# Patient Record
Sex: FEMALE | Race: BLACK OR AFRICAN AMERICAN | NOT HISPANIC OR LATINO | Employment: UNEMPLOYED | URBAN - METROPOLITAN AREA
[De-identification: names, ages, dates, MRNs, and addresses within clinical notes are randomized per-mention and may not be internally consistent; named-entity substitution may affect disease eponyms.]

---

## 2023-04-20 DIAGNOSIS — N63.22 MASS OF UPPER INNER QUADRANT OF LEFT BREAST: Primary | ICD-10-CM

## 2023-10-31 ENCOUNTER — HOSPITAL ENCOUNTER (EMERGENCY)
Facility: HOSPITAL | Age: 33
Discharge: HOME | End: 2023-10-31
Attending: EMERGENCY MEDICINE
Payer: MEDICAID

## 2023-10-31 VITALS
RESPIRATION RATE: 20 BRPM | SYSTOLIC BLOOD PRESSURE: 126 MMHG | TEMPERATURE: 98 F | DIASTOLIC BLOOD PRESSURE: 66 MMHG | HEART RATE: 61 BPM | OXYGEN SATURATION: 100 %

## 2023-10-31 DIAGNOSIS — L02.91 ABSCESS: Primary | ICD-10-CM

## 2023-10-31 PROCEDURE — 99284 EMERGENCY DEPT VISIT MOD MDM: CPT

## 2023-10-31 RX ORDER — CLINDAMYCIN HYDROCHLORIDE 300 MG/1
300 CAPSULE ORAL EVERY 6 HOURS
Qty: 28 CAPSULE | Refills: 0 | Status: SHIPPED | OUTPATIENT
Start: 2023-10-31 | End: 2023-11-07

## 2023-10-31 RX ORDER — HYDROCODONE BITARTRATE AND ACETAMINOPHEN 5; 325 MG/1; MG/1
1 TABLET ORAL EVERY 6 HOURS PRN
Qty: 12 TABLET | Refills: 0 | Status: SHIPPED | OUTPATIENT
Start: 2023-10-31

## 2023-10-31 NOTE — ED PROVIDER NOTES
Encounter Date: 10/31/2023       History     Chief Complaint   Patient presents with    Abscess     Reports recurring abscess/boil to L breast after biopsy. Has had it drained/seen wound care/ spot keeps coming back. Reports fevers at home, max 101F     HPI  32y/o female with PMH presents to ED for left breast lesion first noticed last night. Associated symptoms include Tmax 101 and pain. Denies nipple or lesion drainage. Had biopsy in 2020 without evidence of malignancy. Patient reports rejection of surgical clip at that time which body expelled and had had recurrent abscessed since. Had surgery at [facility] approx 2mo ago for tract closure, which improved symptoms. Last abx use, clindamycin, 1 month ago.    Review of patient's allergies indicates:  No Known Allergies  No past medical history on file.  No past surgical history on file.  No family history on file.     Review of Systems   Constitutional:  Negative for fever.   HENT:  Negative for sore throat.    Respiratory:  Negative for shortness of breath.    Cardiovascular:  Negative for chest pain.   Gastrointestinal:  Negative for nausea.   Genitourinary:  Negative for dysuria.   Musculoskeletal:  Negative for back pain.   Skin:  Negative for rash.   Neurological:  Negative for weakness.   Hematological:  Does not bruise/bleed easily.     Physical Exam     Initial Vitals [10/31/23 0924]   BP Pulse Resp Temp SpO2   129/69 76 17 98.8 °F (37.1 °C) 99 %      MAP       --         Physical Exam    HENT:   Head: Normocephalic and atraumatic.   Pulmonary/Chest:       Neurological: She is oriented to person, place, and time.   Skin: Skin is warm and dry.   1.5 round lesion with central induration, tenderness to palpation, and surrounding erythema without evidence of nipple involvement or drainage.          ED Course   Procedures  Labs Reviewed - No data to display       Imaging Results    None          Medications - No data to display  Medical Decision  Making  Recurrence of abscess formation rec drainage with out systemic symptoms or presentation. Patient declined I&D, however prior to discharge, lesion began to drain and dressed with apprioraite wound care. Hygiene and after care instructions discussed with patient.  reviewed, and pain medication sent to pharmacy as well as abx. Referral to [facility] surgery placed. Patient voiced understanding and agreement with plan.     Risk  Prescription drug management.                               Clinical Impression:   Final diagnoses:  [L02.91] Abscess (Primary)        ED Disposition Condition    Discharge Stable          ED Prescriptions       Medication Sig Dispense Start Date End Date Auth. Provider    HYDROcodone-acetaminophen (NORCO) 5-325 mg per tablet Take 1 tablet by mouth every 6 (six) hours as needed for Pain. 12 tablet 10/31/2023 -- Lina Gentile MD    clindamycin (CLEOCIN) 300 MG capsule Take 1 capsule (300 mg total) by mouth every 6 (six) hours. for 7 days 28 capsule 10/31/2023 11/7/2023 Lina Gentile MD          Follow-up Information       Follow up With Specialties Details Why Contact Info    [facility] General Surgery  Someone will contact you to schedule appointment. if you do not hear form them in 1 week, call office. [address] [id number] [phone]    [facility]  Emergency Dept Emergency Medicine  If symptoms worsen, As needed [address] [id number] [phone]             Lina Gentile MD  Resident  10/31/23 1202       Lina Gentile MD  Resident  10/31/23 1202

## 2024-10-02 ENCOUNTER — HOSPITAL ENCOUNTER (INPATIENT)
Facility: HOSPITAL | Age: 34
LOS: 6 days | Discharge: LEFT BEFORE DISCHARGE | DRG: 584 | End: 2024-10-08
Attending: INTERNAL MEDICINE | Admitting: INTERNAL MEDICINE
Payer: COMMERCIAL

## 2024-10-02 DIAGNOSIS — N64.4 BREAST PAIN: Primary | ICD-10-CM

## 2024-10-02 DIAGNOSIS — N61.1 ABSCESS OF RIGHT BREAST: ICD-10-CM

## 2024-10-02 DIAGNOSIS — R00.1 BRADYCARDIA: ICD-10-CM

## 2024-10-02 LAB
ALBUMIN SERPL-MCNC: 3.5 G/DL (ref 3.5–5)
ALBUMIN/GLOB SERPL: 0.9 RATIO (ref 1.1–2)
ALP SERPL-CCNC: 91 UNIT/L (ref 40–150)
ALT SERPL-CCNC: 16 UNIT/L (ref 0–55)
ANION GAP SERPL CALC-SCNC: 9 MEQ/L
AST SERPL-CCNC: 15 UNIT/L (ref 5–34)
B-HCG UR QL: NEGATIVE
BASOPHILS # BLD AUTO: 0.04 X10(3)/MCL
BASOPHILS NFR BLD AUTO: 0.4 %
BILIRUB SERPL-MCNC: 0.5 MG/DL
BUN SERPL-MCNC: 9.6 MG/DL (ref 7–18.7)
CALCIUM SERPL-MCNC: 9.2 MG/DL (ref 8.4–10.2)
CHLORIDE SERPL-SCNC: 103 MMOL/L (ref 98–107)
CO2 SERPL-SCNC: 27 MMOL/L (ref 22–29)
CREAT SERPL-MCNC: 0.82 MG/DL (ref 0.55–1.02)
CREAT/UREA NIT SERPL: 12
EOSINOPHIL # BLD AUTO: 0.36 X10(3)/MCL (ref 0–0.9)
EOSINOPHIL NFR BLD AUTO: 3.2 %
ERYTHROCYTE [DISTWIDTH] IN BLOOD BY AUTOMATED COUNT: 12.8 % (ref 11.5–17)
GFR SERPLBLD CREATININE-BSD FMLA CKD-EPI: >60 ML/MIN/1.73/M2
GLOBULIN SER-MCNC: 4 GM/DL (ref 2.4–3.5)
GLUCOSE SERPL-MCNC: 99 MG/DL (ref 74–100)
HCT VFR BLD AUTO: 38.8 % (ref 37–47)
HGB BLD-MCNC: 13.2 G/DL (ref 12–16)
IMM GRANULOCYTES # BLD AUTO: 0.01 X10(3)/MCL (ref 0–0.04)
IMM GRANULOCYTES NFR BLD AUTO: 0.1 %
LYMPHOCYTES # BLD AUTO: 2.39 X10(3)/MCL (ref 0.6–4.6)
LYMPHOCYTES NFR BLD AUTO: 21.1 %
MCH RBC QN AUTO: 33.3 PG (ref 27–31)
MCHC RBC AUTO-ENTMCNC: 34 G/DL (ref 33–36)
MCV RBC AUTO: 98 FL (ref 80–94)
MONOCYTES # BLD AUTO: 0.52 X10(3)/MCL (ref 0.1–1.3)
MONOCYTES NFR BLD AUTO: 4.6 %
NEUTROPHILS # BLD AUTO: 8.01 X10(3)/MCL (ref 2.1–9.2)
NEUTROPHILS NFR BLD AUTO: 70.6 %
NRBC BLD AUTO-RTO: 0 %
PLATELET # BLD AUTO: 287 X10(3)/MCL (ref 130–400)
PMV BLD AUTO: 10.3 FL (ref 7.4–10.4)
POTASSIUM SERPL-SCNC: 4.1 MMOL/L (ref 3.5–5.1)
PROT SERPL-MCNC: 7.5 GM/DL (ref 6.4–8.3)
RBC # BLD AUTO: 3.96 X10(6)/MCL (ref 4.2–5.4)
SODIUM SERPL-SCNC: 139 MMOL/L (ref 136–145)
WBC # BLD AUTO: 11.33 X10(3)/MCL (ref 4.5–11.5)

## 2024-10-02 PROCEDURE — 99285 EMERGENCY DEPT VISIT HI MDM: CPT | Mod: 25

## 2024-10-02 PROCEDURE — 80053 COMPREHEN METABOLIC PANEL: CPT | Performed by: NURSE PRACTITIONER

## 2024-10-02 PROCEDURE — 81025 URINE PREGNANCY TEST: CPT | Performed by: NURSE PRACTITIONER

## 2024-10-02 PROCEDURE — 25000003 PHARM REV CODE 250

## 2024-10-02 PROCEDURE — 85025 COMPLETE CBC W/AUTO DIFF WBC: CPT | Performed by: NURSE PRACTITIONER

## 2024-10-02 PROCEDURE — 11000001 HC ACUTE MED/SURG PRIVATE ROOM

## 2024-10-02 PROCEDURE — S4991 NICOTINE PATCH NONLEGEND: HCPCS

## 2024-10-02 RX ORDER — IBUPROFEN 200 MG
1 TABLET ORAL
Status: DISCONTINUED | OUTPATIENT
Start: 2024-10-02 | End: 2024-10-03

## 2024-10-02 RX ADMIN — NICOTINE 1 PATCH: 21 PATCH, EXTENDED RELEASE TRANSDERMAL at 09:10

## 2024-10-02 NOTE — FIRST PROVIDER EVALUATION
Medical screening examination initiated.  I have conducted a focused provider triage encounter, findings are as follows:    Brief history of present illness:  Patient states redness and swelling to right breast. States fever x 3 days.     There were no vitals filed for this visit.    Pertinent physical exam:  Awake, alert, ambulatory      Brief workup plan:  Labs, Exam    Preliminary workup initiated; this workup will be continued and followed by the physician or advanced practice provider that is assigned to the patient when roomed.

## 2024-10-02 NOTE — Clinical Note
Diagnosis: Breast pain [200743]   Future Attending Provider: EDWARD OLIVIER [[id number]]   Admit to which facility:: [facility] [[id number]]   Reason for IP Medical Treatment  (Clinical interventions that can only be accomplished in the IP setting? ) :: evaluation and treatment of breast mass   Plans for Post-Acute care--if anticipated (pick the single best option):: A. No post acute care anticipated at this time   Special Needs:: No Special Needs [1]

## 2024-10-03 PROBLEM — N64.4 BREAST PAIN: Status: ACTIVE | Noted: 2024-10-03

## 2024-10-03 LAB
ALBUMIN SERPL-MCNC: 3.6 G/DL (ref 3.5–5)
ALBUMIN/GLOB SERPL: 0.9 RATIO (ref 1.1–2)
ALP SERPL-CCNC: 106 UNIT/L (ref 40–150)
ALT SERPL-CCNC: 15 UNIT/L (ref 0–55)
ANION GAP SERPL CALC-SCNC: 8 MEQ/L
AST SERPL-CCNC: 19 UNIT/L (ref 5–34)
BASOPHILS # BLD AUTO: 0.04 X10(3)/MCL
BASOPHILS NFR BLD AUTO: 0.4 %
BILIRUB SERPL-MCNC: 0.4 MG/DL
BUN SERPL-MCNC: 10.8 MG/DL (ref 7–18.7)
CALCIUM SERPL-MCNC: 9.4 MG/DL (ref 8.4–10.2)
CHLORIDE SERPL-SCNC: 104 MMOL/L (ref 98–107)
CO2 SERPL-SCNC: 26 MMOL/L (ref 22–29)
CREAT SERPL-MCNC: 0.8 MG/DL (ref 0.55–1.02)
CREAT/UREA NIT SERPL: 14
EOSINOPHIL # BLD AUTO: 0.46 X10(3)/MCL (ref 0–0.9)
EOSINOPHIL NFR BLD AUTO: 4.7 %
ERYTHROCYTE [DISTWIDTH] IN BLOOD BY AUTOMATED COUNT: 13 % (ref 11.5–17)
GFR SERPLBLD CREATININE-BSD FMLA CKD-EPI: >60 ML/MIN/1.73/M2
GLOBULIN SER-MCNC: 4 GM/DL (ref 2.4–3.5)
GLUCOSE SERPL-MCNC: 88 MG/DL (ref 74–100)
HCT VFR BLD AUTO: 41.5 % (ref 37–47)
HGB BLD-MCNC: 13.7 G/DL (ref 12–16)
IMM GRANULOCYTES # BLD AUTO: 0.02 X10(3)/MCL (ref 0–0.04)
IMM GRANULOCYTES NFR BLD AUTO: 0.2 %
LYMPHOCYTES # BLD AUTO: 3.35 X10(3)/MCL (ref 0.6–4.6)
LYMPHOCYTES NFR BLD AUTO: 34 %
MCH RBC QN AUTO: 33.2 PG (ref 27–31)
MCHC RBC AUTO-ENTMCNC: 33 G/DL (ref 33–36)
MCV RBC AUTO: 100.5 FL (ref 80–94)
MONOCYTES # BLD AUTO: 0.59 X10(3)/MCL (ref 0.1–1.3)
MONOCYTES NFR BLD AUTO: 6 %
NEUTROPHILS # BLD AUTO: 5.38 X10(3)/MCL (ref 2.1–9.2)
NEUTROPHILS NFR BLD AUTO: 54.7 %
NRBC BLD AUTO-RTO: 0 %
PLATELET # BLD AUTO: 270 X10(3)/MCL (ref 130–400)
PMV BLD AUTO: 10.6 FL (ref 7.4–10.4)
POTASSIUM SERPL-SCNC: 3.8 MMOL/L (ref 3.5–5.1)
PROT SERPL-MCNC: 7.6 GM/DL (ref 6.4–8.3)
RBC # BLD AUTO: 4.13 X10(6)/MCL (ref 4.2–5.4)
SODIUM SERPL-SCNC: 138 MMOL/L (ref 136–145)
WBC # BLD AUTO: 9.84 X10(3)/MCL (ref 4.5–11.5)

## 2024-10-03 PROCEDURE — 85025 COMPLETE CBC W/AUTO DIFF WBC: CPT | Performed by: INTERNAL MEDICINE

## 2024-10-03 PROCEDURE — S4991 NICOTINE PATCH NONLEGEND: HCPCS | Performed by: INTERNAL MEDICINE

## 2024-10-03 PROCEDURE — 25000003 PHARM REV CODE 250: Performed by: INTERNAL MEDICINE

## 2024-10-03 PROCEDURE — 87040 BLOOD CULTURE FOR BACTERIA: CPT | Performed by: INTERNAL MEDICINE

## 2024-10-03 PROCEDURE — 36415 COLL VENOUS BLD VENIPUNCTURE: CPT | Performed by: INTERNAL MEDICINE

## 2024-10-03 PROCEDURE — 11000001 HC ACUTE MED/SURG PRIVATE ROOM

## 2024-10-03 PROCEDURE — 80053 COMPREHEN METABOLIC PANEL: CPT | Performed by: INTERNAL MEDICINE

## 2024-10-03 PROCEDURE — 63600175 PHARM REV CODE 636 W HCPCS: Performed by: INTERNAL MEDICINE

## 2024-10-03 RX ORDER — ONDANSETRON HYDROCHLORIDE 2 MG/ML
4 INJECTION, SOLUTION INTRAVENOUS EVERY 8 HOURS PRN
Status: DISCONTINUED | OUTPATIENT
Start: 2024-10-03 | End: 2024-10-06

## 2024-10-03 RX ORDER — KETOROLAC TROMETHAMINE 30 MG/ML
15 INJECTION, SOLUTION INTRAMUSCULAR; INTRAVENOUS EVERY 6 HOURS PRN
Status: DISCONTINUED | OUTPATIENT
Start: 2024-10-03 | End: 2024-10-05

## 2024-10-03 RX ORDER — HYDROCODONE BITARTRATE AND ACETAMINOPHEN 7.5; 325 MG/1; MG/1
1 TABLET ORAL EVERY 6 HOURS PRN
Status: DISCONTINUED | OUTPATIENT
Start: 2024-10-03 | End: 2024-10-04

## 2024-10-03 RX ORDER — TALC
6 POWDER (GRAM) TOPICAL NIGHTLY PRN
Status: DISCONTINUED | OUTPATIENT
Start: 2024-10-03 | End: 2024-10-08 | Stop reason: HOSPADM

## 2024-10-03 RX ORDER — ENOXAPARIN SODIUM 100 MG/ML
40 INJECTION SUBCUTANEOUS EVERY 24 HOURS
Status: DISCONTINUED | OUTPATIENT
Start: 2024-10-03 | End: 2024-10-08

## 2024-10-03 RX ORDER — SODIUM CHLORIDE 0.9 % (FLUSH) 0.9 %
10 SYRINGE (ML) INJECTION
Status: DISCONTINUED | OUTPATIENT
Start: 2024-10-03 | End: 2024-10-08 | Stop reason: HOSPADM

## 2024-10-03 RX ORDER — KETOROLAC TROMETHAMINE 30 MG/ML
15 INJECTION, SOLUTION INTRAMUSCULAR; INTRAVENOUS EVERY 6 HOURS PRN
Status: DISCONTINUED | OUTPATIENT
Start: 2024-10-03 | End: 2024-10-03

## 2024-10-03 RX ORDER — ACETAMINOPHEN 325 MG/1
650 TABLET ORAL EVERY 8 HOURS PRN
Status: DISCONTINUED | OUTPATIENT
Start: 2024-10-03 | End: 2024-10-04

## 2024-10-03 RX ORDER — IBUPROFEN 200 MG
1 TABLET ORAL DAILY
Status: DISCONTINUED | OUTPATIENT
Start: 2024-10-03 | End: 2024-10-08 | Stop reason: HOSPADM

## 2024-10-03 RX ADMIN — ACETAMINOPHEN 650 MG: 325 TABLET ORAL at 02:10

## 2024-10-03 RX ADMIN — CEFEPIME 1 G: 1 INJECTION, POWDER, FOR SOLUTION INTRAMUSCULAR; INTRAVENOUS at 09:10

## 2024-10-03 RX ADMIN — HYDROCODONE BITARTRATE AND ACETAMINOPHEN 1 TABLET: 7.5; 325 TABLET ORAL at 01:10

## 2024-10-03 RX ADMIN — VANCOMYCIN HYDROCHLORIDE 2500 MG: 1.25 INJECTION, POWDER, LYOPHILIZED, FOR SOLUTION INTRAVENOUS at 01:10

## 2024-10-03 RX ADMIN — KETOROLAC TROMETHAMINE 15 MG: 30 INJECTION, SOLUTION INTRAMUSCULAR at 09:10

## 2024-10-03 RX ADMIN — KETOROLAC TROMETHAMINE 15 MG: 30 INJECTION, SOLUTION INTRAMUSCULAR at 02:10

## 2024-10-03 RX ADMIN — KETOROLAC TROMETHAMINE 15 MG: 30 INJECTION, SOLUTION INTRAMUSCULAR at 12:10

## 2024-10-03 RX ADMIN — HYDROCODONE BITARTRATE AND ACETAMINOPHEN 1 TABLET: 7.5; 325 TABLET ORAL at 07:10

## 2024-10-03 RX ADMIN — KETOROLAC TROMETHAMINE 15 MG: 30 INJECTION, SOLUTION INTRAMUSCULAR at 07:10

## 2024-10-03 RX ADMIN — NICOTINE 1 PATCH: 21 PATCH, EXTENDED RELEASE TRANSDERMAL at 05:10

## 2024-10-03 RX ADMIN — ENOXAPARIN SODIUM 40 MG: 40 INJECTION SUBCUTANEOUS at 05:10

## 2024-10-03 RX ADMIN — VANCOMYCIN HYDROCHLORIDE 2000 MG: 500 INJECTION, POWDER, LYOPHILIZED, FOR SOLUTION INTRAVENOUS at 12:10

## 2024-10-03 NOTE — ED PROVIDER NOTES
"Encounter Date: 10/2/2024       History     Chief Complaint   Patient presents with    Breast Mass     Reports mass in left breast, sent by doctor for eval. Reports fever x 3 days. Also endorses nausea and diarrhea. Reports has been on abx past 2 weeks for recent removal of "mass" in left breast. Pt endorses redness and erythema to right breast.     59 y.o. White male presents to Emergency Department with a chief complaint of R breast pain. Symptoms began several days ago and have been worsening since onset. Patient reports she initially started with abscess to her L breast. She saw telemedicine and was prescribed Bactrim and Clindamycin 2 weeks ago. Despite abx therapy, patient's symptoms began on R breast and have been worsening. Associated symptoms include R breast pain, swelling, firmness, redness, and fever. Symptoms are aggravated with palpation and there are no alleviating factors. The patient denies CP, SOB, fever, chills, injury, or vomiting. No other reported symptoms at this time      The history is provided by the patient. No  was used.   Illness   The current episode started several days ago. The problem occurs continuously. The problem has been gradually worsening. Associated symptoms include a fever. Pertinent negatives include no photophobia, no abdominal pain, no nausea, no vomiting, no stridor, no cough and no shortness of breath. Services received include medications given. Recently, medical care has been given by the PCP.     Review of patient's allergies indicates:   Allergen Reactions    Iodine Rash     No past medical history on file.  No past surgical history on file.  No family history on file.     Review of Systems   Constitutional:  Positive for fever. Negative for chills and fatigue.   Eyes:  Negative for photophobia and visual disturbance.   Respiratory:  Negative for cough, shortness of breath and stridor.    Cardiovascular:  Negative for chest pain, palpitations and " leg swelling.   Gastrointestinal:  Negative for abdominal pain, nausea and vomiting.   Skin:  Positive for color change.   All other systems reviewed and are negative.      Physical Exam     Initial Vitals [10/02/24 1711]   BP Pulse Resp Temp SpO2   (!) 141/96 65 18 98.1 °F (36.7 °C) 100 %      MAP       --         Physical Exam    Nursing note and vitals reviewed.  Constitutional: She appears well-developed and well-nourished. She is not diaphoretic. She is cooperative.  Non-toxic appearance. No distress.   HENT:   Head: Normocephalic and atraumatic.   Right Ear: External ear normal.   Left Ear: External ear normal.   Nose: Nose normal.   Eyes: Conjunctivae and EOM are normal. Pupils are equal, round, and reactive to light.   Neck: Neck supple.   Normal range of motion.  Cardiovascular:  Normal rate, regular rhythm, S1 normal, S2 normal, normal heart sounds, intact distal pulses and normal pulses.           Pulmonary/Chest: Effort normal and breath sounds normal. No tachypnea and no bradypnea. No respiratory distress. She has no decreased breath sounds. She has no wheezes. She has no rhonchi. She has no rales. Right breast exhibits tenderness. Right breast exhibits no inverted nipple and no nipple discharge. Left breast exhibits no inverted nipple and no nipple discharge. There is breast swelling.   Tenderness, redness, and firmness noted to R breast. Healing wound noted to L breast. Patient reports previous abscess there. Exam performed with KRISTAL Espinosa as chaperone. No fluctuance noted.    Abdominal: Abdomen is soft. Bowel sounds are normal. She exhibits no distension. There is no abdominal tenderness. There is no rebound.   Genitourinary: There is breast swelling.   Musculoskeletal:         General: Normal range of motion.      Cervical back: Normal range of motion and neck supple.     Neurological: She is alert and oriented to person, place, and time. She has normal strength. No sensory deficit. GCS score is 15.  GCS eye subscore is 4. GCS verbal subscore is 5. GCS motor subscore is 6.   Skin: Skin is warm and dry. Capillary refill takes less than 2 seconds. There is erythema.   Psychiatric: She has a normal mood and affect. Thought content normal.         ED Course   Procedures  Labs Reviewed   COMPREHENSIVE METABOLIC PANEL - Abnormal       Result Value    Sodium 139      Potassium 4.1      Chloride 103      CO2 27      Glucose 99      Blood Urea Nitrogen 9.6      Creatinine 0.82      Calcium 9.2      Protein Total 7.5      Albumin 3.5      Globulin 4.0 (*)     Albumin/Globulin Ratio 0.9 (*)     Bilirubin Total 0.5      ALP 91      ALT 16      AST 15      eGFR >60      Anion Gap 9.0      BUN/Creatinine Ratio 12     CBC WITH DIFFERENTIAL - Abnormal    WBC 11.33      RBC 3.96 (*)     Hgb 13.2      Hct 38.8      MCV 98.0 (*)     MCH 33.3 (*)     MCHC 34.0      RDW 12.8      Platelet 287      MPV 10.3      Neut % 70.6      Lymph % 21.1      Mono % 4.6      Eos % 3.2      Basophil % 0.4      Lymph # 2.39      Neut # 8.01      Mono # 0.52      Eos # 0.36      Baso # 0.04      IG# 0.01      IG% 0.1      NRBC% 0.0     HCG QUALITATIVE URINE - Normal    hCG Qualitative, Urine Negative     CBC W/ AUTO DIFFERENTIAL    Narrative:     The following orders were created for panel order CBC Auto Differential.  Procedure                               Abnormality         Status                     ---------                               -----------         ------                     CBC with Differential[[id number]]       Abnormal            Final result                 Please view results for these tests on the individual orders.          Imaging Results    None          Medications   nicotine 21 mg/24 hr 1 patch (1 patch Transdermal Patch Applied 10/2/24 2130)     Medical Decision Making  Patient awake, alert, has non-labored breathing, and follows commands appropriately. Arrived to ED due to R breast problem. Symptoms began 4 days ago.  States she had issues with her L breast that started 2 weeks ago. Tele-medicine prescribed Bactrim and Clindamycin for treatment. Afebrile currently. States Tmax 102 at home. NAD Noted.     Judging by the patient's chief complaint and pertinent history, the patient has the following possible differential diagnoses, including but not limited to the following: Mastitis, Breast Mass, Breast Abscess, Cellulitis     Some of these are deemed to be lower likelihood and some more likely based on my physical exam and history combined with possible lab work and/or imaging studies. Please see the pertinent studies, and refer to the HPI. Some of these diagnoses will take further evaluation to fully rule out, perhaps as an outpatient and the patient was encouraged to follow up when discharged for more comprehensive evaluation.       Amount and/or Complexity of Data Reviewed  Labs: ordered.     Details: Labs essentially unremarkable on today. Informed patient of results.   Radiology: ordered.    Risk  OTC drugs.               ED Course as of 10/02/24 2224   Wed Oct 02, 2024   2124 Discussed patient with Dr. Ann. Patient failed outpatient therapy. Will obtain US of breast and admit to hospital for further evaluation and treatment.      paged.  [JA]   2223 Discussed patient with Dr. Pichardo, with . Will admit to services and obtain US of breast. No further instruction or recommendations given.  [JA]      ED Course User Index  [JA] Kathie Quinones NP                           Clinical Impression:  Final diagnoses:  [N64.4] Breast pain (Primary)          ED Disposition Condition    Admit                 Katihe Quinones NP  10/02/24 2224

## 2024-10-03 NOTE — PROGRESS NOTES
"Pharmacokinetic Initial Assessment: IV Vancomycin    Assessment/Plan:    Initiate intravenous vancomycin with loading dose of 2500 mg once followed by a maintenance dose of vancomycin 2000 mg IV every 12 hours  Desired empiric serum trough concentration is 15 to 20 mcg/mL  Draw vancomycin trough level 60 min prior to fourth dose on 10/04 at approximately 1230  Pharmacy will continue to follow and monitor vancomycin.      Please contact pharmacy at extension [phone] with any questions regarding this assessment.     Thank you for the consult,   Ashley Madrid       Patient brief summary:  Rene Alston is a 34 y.o. female initiated on antimicrobial therapy with IV Vancomycin for treatment of suspected skin & soft tissue infection    Drug Allergies:   Review of patient's allergies indicates:   Allergen Reactions    Iodine Rash       Actual Body Weight:   127 kg    Renal Function:   Estimated Creatinine Clearance: 142.4 mL/min (based on SCr of 0.82 mg/dL).,     Dialysis Method (if applicable):  N/A    CBC (last 72 hours):  Recent Labs   Lab Result Units 10/02/24  1743   WBC x10(3)/mcL 11.33   Hgb g/dL 13.2   Hct % 38.8   Platelet x10(3)/mcL 287   Mono % % 4.6   Eos % % 3.2   Basophil % % 0.4       Metabolic Panel (last 72 hours):  Recent Labs   Lab Result Units 10/02/24  1743   Sodium mmol/L 139   Potassium mmol/L 4.1   Chloride mmol/L 103   CO2 mmol/L 27   Glucose mg/dL 99   Blood Urea Nitrogen mg/dL 9.6   Creatinine mg/dL 0.82   Albumin g/dL 3.5   Bilirubin Total mg/dL 0.5   ALP unit/L 91   AST unit/L 15   ALT unit/L 16       Drug levels (last 3 results):  No results for input(s): "VANCOMYCINRA", "VANCORANDOM", "VANCOMYCINPE", "VANCOPEAK", "VANCOMYCINTR", "VANCOTROUGH" in the last 72 hours.    Microbiologic Results:  Microbiology Results (last 7 days)       ** No results found for the last 168 hours. **            "

## 2024-10-03 NOTE — PROGRESS NOTES
[facility] Hospital Medicine Progress Note        Chief Complaint: Inpatient Follow-up    HPI:     34-year-old  female with significant history of recurrent breast cellulitis/abscess in the past . patient has had mammogram which was reportedly benign and has had left breast biopsy which was reportedly benign.  She does follow up with Oncology as outpatient and reports that she is currently being worked up for ?  Leukemia.  Patient presented to the ED with complaints of right breast swelling, pain, erythema.  Was recently treated with clindamycin/Bactrim for left breast cellulitis.  Afebrile and stable hemodynamics in the ED. labs stable.  Admitted to hospital medicine services and was initiated on IV vancomycin, ultrasound soft tissue breast was ordered to rule out abscess.    Interval Hx:   Patient seen at bedside, persistent pain, swelling and erythema in right breast, patient is also bradycardic, but asymptomatic from the same, patient is afebrile, no other new complaints    Objective/physical exam:  General: In no acute distress, afebrile  Chest: Clear to auscultation bilaterally, right breast swollen, tender  Heart: S1, S2, no appreciable murmur  Abdomen: Soft, nontender, BS +  MSK: Warm, no lower extremity edema, no clubbing or cyanosis  Neurologic: Alert and oriented x4, moving all extremities with good strength     VITAL SIGNS: 24 HRS MIN & MAX LAST   Temp  Min: 97.9 °F (36.6 °C)  Max: 98.3 °F (36.8 °C) 97.9 °F (36.6 °C)   BP  Min: 101/58  Max: 141/96 (!) 101/58   Pulse  Min: 53  Max: 65  (!) 53   Resp  Min: 18  Max: 18 18   SpO2  Min: 100 %  Max: 100 % 100 %       Recent Labs   Lab 10/03/24  0713   WBC 9.84   RBC 4.13*   HGB 13.7   HCT 41.5   .5*   MCH 33.2*   MCHC 33.0   RDW 13.0      MPV 10.6*         Recent Labs   Lab 10/03/24  0713      K 3.8      CO2 26   BUN 10.8   CREATININE 0.80   CALCIUM 9.4   ALBUMIN 3.6   ALKPHOS 106   ALT  15   AST 19   BILITOT 0.4          Microbiology Results (last 7 days)       ** No results found for the last 168 hours. **             Scheduled Med:   enoxparin  40 mg Subcutaneous Daily    nicotine  1 patch Transdermal ED 1 Time    vancomycin (VANCOCIN) IV (PEDS and ADULTS)  2,000 mg Intravenous Q12H          Assessment/Plan:    Right breast cellulitis-rule out abscess  History of recurrent bilateral breast cellulitis with abscess requiring intervention  Asymptomatic sinus bradycardia   Chronic tobacco use  Prophylaxis    Patient is on IV vancomycin   Add cefepime for Gram-negative coverage   Patient reports she has had mammogram which was benign and also underwent left breast biopsy in the past which was benign pathology  I will consult breast surgeon for further recommendations   Soft tissue ultrasound was ordered on admit to rule out abscess, still pending  Blood cultures x2  Echocardiogram to further evaluate bradycardia   Patient is asymptomatic   Continue nicotine patch   Reports being worked up for leukemia, blood counts normal, continue outpatient follow up with Hematology/Oncology  P.r.n. narcotics for pain associated with cellulitis  DVT prophylaxis-subQ Lovenox      Leni Woods MD   10/03/2024

## 2024-10-03 NOTE — H&P
[facility] Hospital Medicine History & Physical Examination       Patient Name: Rene Alston  MRN: [id number]  Patient Class: IP- Inpatient   Admission Date: 10/2/2024  5:15 PM  Length of Stay: 1  Admitting Service: Hospital Medicine   Attending Physician: Ifeanyi Pichardo MD   Primary Care Provider: No, Primary Doctor  History source: EMR, patient and/or patient's family    CHIEF COMPLAINT   Breast pain     HISTORY OF PRESENT ILLNESS:   Patient is a 34-year-old female with no chronic medical conditions who presents to the ER with complaints of right breast pain over the last several days.  Patient reports over the last month she was diagnosed with left breast abscess which spontaneously drained and she was started on clindamycin and Bactrim 4 days ago.  The redness and pain in her left breast did increase but her right breast started to hurt and become erythematous even while on these current antibiotics for this reason she presented to the ER for further evaluation where laboratory work was unremarkable.  Hospitalist was consulted for admission for cellulitis.    PAST MEDICAL HISTORY:   Denies chronic medical conditions    PAST SURGICAL HISTORY:   No past surgical history on file.    ALLERGIES:   Iodine    FAMILY HISTORY:   Reviewed and non-contributory     SOCIAL HISTORY:   Screening for Social Drivers for health: Patient screened for food insecurity, housing instability, transportation needs, utility   difficulties, and interpersonal safety (select all that apply as identified as concern)  []Housing or Food  []Transportation Needs  []Utility Difficulties  []Interpersonal safety  [x]None  Social History     Tobacco Use    Smoking status: Not on file    Smokeless tobacco: Not on file   Substance Use Topics    Alcohol use: Not on file        HOME MEDICATIONS:     Prior to Admission medications    Medication Sig Start Date End Date Taking? Authorizing Provider  "  HYDROcodone-acetaminophen (NORCO) 5-325 mg per tablet Take 1 tablet by mouth every 6 (six) hours as needed for Pain. 10/31/23   Lina Gentile MD       REVIEW OF SYSTEMS:   Except as documented, all other systems reviewed and negative     PHYSICAL EXAM:   T 98.3 °F (36.8 °C)   /82   P (!) 57   RR 18   O2 100 %  GENERAL: awake, alert, oriented and in no acute distress, non-toxic appearing   HEENT: normocephalic atraumatic   NECK: supple   LUNGS: Clear bilaterally, no wheezing or rales, no accessory muscle use   CVS: Regular rate and rhythm, normal peripheral perfusion  ABD: Soft, non-tender, non-distended, bowel sounds present  EXTREMITIES: no clubbing or cyanosis  SKIN: Warm, dry.  Chaperone present in room, erythema and tenderness noted under right breast, no fluctuance  NEURO: alert and oriented, grossly without focal deficits   PSYCHIATRIC: Cooperative    LABS AND IMAGING:     Recent Labs     10/02/24  1743   WBC 11.33   RBC 3.96*   HGB 13.2   HCT 38.8   MCV 98.0*   MCH 33.3*   MCHC 34.0   RDW 12.8        No results for input(s): "LACTIC" in the last 72 hours.  No results for input(s): "INR", "APTT", "D-DIMER" in the last 72 hours.  No results for input(s): "HGBA1C", "CHOL", "TRIG", "LDL", "VLDL", "HDL" in the last 72 hours.   Recent Labs     10/02/24  1743      K 4.1   CO2 27   BUN 9.6   CREATININE 0.82   GLUCOSE 99   CALCIUM 9.2   ALBUMIN 3.5   GLOBULIN 4.0*   ALKPHOS 91   ALT 16   AST 15   BILITOT 0.5     No results for input(s): "BNP", "CPK", "TROPONINI" in the last 72 hours.           ASSESSMENT & PLAN:   Right breast cellulitis    -blood cultures and start IV vanc   -obtain US of the right breast to assess for abscess if present consult surgery    DVT prophylaxis:  Lovenox  Code status:  Full code    If patient was admitted under observational status it is with my approval/permission.     At least 55 min was spent on this history and physical.  Time seen: 11:00 p.m. 10/2  Ifeanyi GIRON" MD [place]

## 2024-10-04 PROBLEM — N61.1 CHRONIC ABSCESS OF BREAST: Status: ACTIVE | Noted: 2024-10-04

## 2024-10-04 PROBLEM — N61.1 ABSCESS OF RIGHT BREAST: Status: ACTIVE | Noted: 2024-10-04

## 2024-10-04 LAB
ALBUMIN SERPL-MCNC: 2.8 G/DL (ref 3.5–5)
ALBUMIN/GLOB SERPL: 0.9 RATIO (ref 1.1–2)
ALP SERPL-CCNC: 88 UNIT/L (ref 40–150)
ALT SERPL-CCNC: 17 UNIT/L (ref 0–55)
ANION GAP SERPL CALC-SCNC: 6 MEQ/L
APICAL FOUR CHAMBER EJECTION FRACTION: 50 %
APICAL TWO CHAMBER EJECTION FRACTION: 57 %
AST SERPL-CCNC: 18 UNIT/L (ref 5–34)
AV INDEX (PROSTH): 0.6
AV MEAN GRADIENT: 4 MMHG
AV PEAK GRADIENT: 6.8 MMHG
AV VELOCITY RATIO: 0.69
BASOPHILS # BLD AUTO: 0.02 X10(3)/MCL
BASOPHILS NFR BLD AUTO: 0.2 %
BILIRUB SERPL-MCNC: 0.2 MG/DL
BSA FOR ECHO PROCEDURE: 2.52 M2
BUN SERPL-MCNC: 10.3 MG/DL (ref 7–18.7)
CALCIUM SERPL-MCNC: 7.9 MG/DL (ref 8.4–10.2)
CHLORIDE SERPL-SCNC: 109 MMOL/L (ref 98–107)
CO2 SERPL-SCNC: 23 MMOL/L (ref 22–29)
CREAT SERPL-MCNC: 0.81 MG/DL (ref 0.55–1.02)
CREAT/UREA NIT SERPL: 13
CV ECHO LV RWT: 0.41 CM
DOP CALC AO PEAK VEL: 1.3 M/S
DOP CALC AO VTI: 31.4 CM
DOP CALC LVOT PEAK VEL: 0.9 M/S
DOP CALCLVOT PEAK VEL VTI: 18.7 CM
E WAVE DECELERATION TIME: 251 MSEC
E/A RATIO: 1.9
E/E' RATIO: 6.4 M/S
ECHO LV POSTERIOR WALL: 1.1 CM (ref 0.6–1.1)
EOSINOPHIL # BLD AUTO: 0.38 X10(3)/MCL (ref 0–0.9)
EOSINOPHIL NFR BLD AUTO: 4.3 %
ERYTHROCYTE [DISTWIDTH] IN BLOOD BY AUTOMATED COUNT: 12.8 % (ref 11.5–17)
FRACTIONAL SHORTENING: 25.9 % (ref 28–44)
GFR SERPLBLD CREATININE-BSD FMLA CKD-EPI: >60 ML/MIN/1.73/M2
GLOBULIN SER-MCNC: 3.1 GM/DL (ref 2.4–3.5)
GLUCOSE SERPL-MCNC: 84 MG/DL (ref 74–100)
HCT VFR BLD AUTO: 34.7 % (ref 37–47)
HGB BLD-MCNC: 11.7 G/DL (ref 12–16)
IMM GRANULOCYTES # BLD AUTO: 0.02 X10(3)/MCL (ref 0–0.04)
IMM GRANULOCYTES NFR BLD AUTO: 0.2 %
INTERVENTRICULAR SEPTUM: 1.1 CM (ref 0.6–1.1)
LEFT ATRIUM AREA SYSTOLIC (APICAL 2 CHAMBER): 16.1 CM2
LEFT ATRIUM AREA SYSTOLIC (APICAL 4 CHAMBER): 18.3 CM2
LEFT ATRIUM SIZE: 3.5 CM
LEFT ATRIUM VOLUME INDEX MOD: 21.1 ML/M2
LEFT ATRIUM VOLUME MOD: 51.3 ML
LEFT INTERNAL DIMENSION IN SYSTOLE: 4 CM (ref 2.1–4)
LEFT VENTRICLE DIASTOLIC VOLUME INDEX: 56.79 ML/M2
LEFT VENTRICLE DIASTOLIC VOLUME: 138 ML
LEFT VENTRICLE END DIASTOLIC VOLUME APICAL 2 CHAMBER: 119 ML
LEFT VENTRICLE END DIASTOLIC VOLUME APICAL 4 CHAMBER: 82.3 ML
LEFT VENTRICLE END SYSTOLIC VOLUME APICAL 2 CHAMBER: 45.1 ML
LEFT VENTRICLE END SYSTOLIC VOLUME APICAL 4 CHAMBER: 51.7 ML
LEFT VENTRICLE MASS INDEX: 96.6 G/M2
LEFT VENTRICLE SYSTOLIC VOLUME INDEX: 27.9 ML/M2
LEFT VENTRICLE SYSTOLIC VOLUME: 67.9 ML
LEFT VENTRICULAR INTERNAL DIMENSION IN DIASTOLE: 5.4 CM (ref 3.5–6)
LEFT VENTRICULAR MASS: 234.8 G
LV LATERAL E/E' RATIO: 5.71 M/S
LV SEPTAL E/E' RATIO: 7.27 M/S
LVED V (TEICH): 138 ML
LVES V (TEICH): 67.9 ML
LVOT MG: 2 MMHG
LVOT MV: 0.57 CM/S
LYMPHOCYTES # BLD AUTO: 2.33 X10(3)/MCL (ref 0.6–4.6)
LYMPHOCYTES NFR BLD AUTO: 26.3 %
MCH RBC QN AUTO: 33.1 PG (ref 27–31)
MCHC RBC AUTO-ENTMCNC: 33.7 G/DL (ref 33–36)
MCV RBC AUTO: 98.3 FL (ref 80–94)
MONOCYTES # BLD AUTO: 0.68 X10(3)/MCL (ref 0.1–1.3)
MONOCYTES NFR BLD AUTO: 7.7 %
MV PEAK A VEL: 0.42 M/S
MV PEAK E VEL: 0.8 M/S
NEUTROPHILS # BLD AUTO: 5.42 X10(3)/MCL (ref 2.1–9.2)
NEUTROPHILS NFR BLD AUTO: 61.3 %
NRBC BLD AUTO-RTO: 0 %
OHS LV EJECTION FRACTION SIMPSONS BIPLANE MOD: 57 %
PLATELET # BLD AUTO: 218 X10(3)/MCL (ref 130–400)
PMV BLD AUTO: 10.5 FL (ref 7.4–10.4)
POTASSIUM SERPL-SCNC: 3.8 MMOL/L (ref 3.5–5.1)
PROT SERPL-MCNC: 5.9 GM/DL (ref 6.4–8.3)
PV PEAK GRADIENT: 4 MMHG
PV PEAK VELOCITY: 0.95 M/S
RBC # BLD AUTO: 3.53 X10(6)/MCL (ref 4.2–5.4)
SODIUM SERPL-SCNC: 138 MMOL/L (ref 136–145)
TDI LATERAL: 0.14 M/S
TDI SEPTAL: 0.11 M/S
TDI: 0.13 M/S
VANCOMYCIN TROUGH SERPL-MCNC: 17.3 UG/ML (ref 15–20)
WBC # BLD AUTO: 8.85 X10(3)/MCL (ref 4.5–11.5)
Z-SCORE OF LEFT VENTRICULAR DIMENSION IN END DIASTOLE: -7.74
Z-SCORE OF LEFT VENTRICULAR DIMENSION IN END SYSTOLE: -4.42

## 2024-10-04 PROCEDURE — 63600175 PHARM REV CODE 636 W HCPCS: Performed by: INTERNAL MEDICINE

## 2024-10-04 PROCEDURE — 25000003 PHARM REV CODE 250: Performed by: PHYSICIAN ASSISTANT

## 2024-10-04 PROCEDURE — 85025 COMPLETE CBC W/AUTO DIFF WBC: CPT | Performed by: INTERNAL MEDICINE

## 2024-10-04 PROCEDURE — 11000001 HC ACUTE MED/SURG PRIVATE ROOM

## 2024-10-04 PROCEDURE — S4991 NICOTINE PATCH NONLEGEND: HCPCS | Performed by: INTERNAL MEDICINE

## 2024-10-04 PROCEDURE — 25000003 PHARM REV CODE 250: Performed by: INTERNAL MEDICINE

## 2024-10-04 PROCEDURE — 25000003 PHARM REV CODE 250: Performed by: NURSE PRACTITIONER

## 2024-10-04 PROCEDURE — 80053 COMPREHEN METABOLIC PANEL: CPT | Performed by: INTERNAL MEDICINE

## 2024-10-04 PROCEDURE — 80202 ASSAY OF VANCOMYCIN: CPT | Performed by: INTERNAL MEDICINE

## 2024-10-04 PROCEDURE — 99222 1ST HOSP IP/OBS MODERATE 55: CPT | Mod: ,,, | Performed by: PHYSICIAN ASSISTANT

## 2024-10-04 PROCEDURE — 36415 COLL VENOUS BLD VENIPUNCTURE: CPT | Performed by: INTERNAL MEDICINE

## 2024-10-04 RX ORDER — ACETAMINOPHEN 500 MG
1000 TABLET ORAL EVERY 8 HOURS PRN
Status: DISCONTINUED | OUTPATIENT
Start: 2024-10-04 | End: 2024-10-08 | Stop reason: HOSPADM

## 2024-10-04 RX ORDER — MAG HYDROX/ALUMINUM HYD/SIMETH 200-200-20
SUSPENSION, ORAL (FINAL DOSE FORM) ORAL 3 TIMES DAILY PRN
Status: DISCONTINUED | OUTPATIENT
Start: 2024-10-04 | End: 2024-10-08 | Stop reason: HOSPADM

## 2024-10-04 RX ORDER — HYDROCODONE BITARTRATE AND ACETAMINOPHEN 7.5; 325 MG/1; MG/1
1 TABLET ORAL EVERY 4 HOURS PRN
Status: DISCONTINUED | OUTPATIENT
Start: 2024-10-04 | End: 2024-10-05

## 2024-10-04 RX ORDER — HYDROCODONE BITARTRATE AND ACETAMINOPHEN 7.5; 325 MG/1; MG/1
1 TABLET ORAL EVERY 4 HOURS PRN
Status: CANCELLED | OUTPATIENT
Start: 2024-10-04

## 2024-10-04 RX ORDER — HYDROCODONE BITARTRATE AND ACETAMINOPHEN 7.5; 325 MG/1; MG/1
1 TABLET ORAL ONCE
Status: COMPLETED | OUTPATIENT
Start: 2024-10-04 | End: 2024-10-04

## 2024-10-04 RX ADMIN — NICOTINE 1 PATCH: 21 PATCH, EXTENDED RELEASE TRANSDERMAL at 09:10

## 2024-10-04 RX ADMIN — HYDROCODONE BITARTRATE AND ACETAMINOPHEN 1 TABLET: 7.5; 325 TABLET ORAL at 05:10

## 2024-10-04 RX ADMIN — CEFEPIME 1 G: 1 INJECTION, POWDER, FOR SOLUTION INTRAMUSCULAR; INTRAVENOUS at 09:10

## 2024-10-04 RX ADMIN — KETOROLAC TROMETHAMINE 15 MG: 30 INJECTION, SOLUTION INTRAMUSCULAR at 06:10

## 2024-10-04 RX ADMIN — ENOXAPARIN SODIUM 40 MG: 40 INJECTION SUBCUTANEOUS at 05:10

## 2024-10-04 RX ADMIN — HYDROCODONE BITARTRATE AND ACETAMINOPHEN 1 TABLET: 7.5; 325 TABLET ORAL at 07:10

## 2024-10-04 RX ADMIN — CEFEPIME 1 G: 1 INJECTION, POWDER, FOR SOLUTION INTRAMUSCULAR; INTRAVENOUS at 04:10

## 2024-10-04 RX ADMIN — HYDROCODONE BITARTRATE AND ACETAMINOPHEN 1 TABLET: 7.5; 325 TABLET ORAL at 12:10

## 2024-10-04 RX ADMIN — HYDROCODONE BITARTRATE AND ACETAMINOPHEN 1 TABLET: 7.5; 325 TABLET ORAL at 09:10

## 2024-10-04 RX ADMIN — KETOROLAC TROMETHAMINE 15 MG: 30 INJECTION, SOLUTION INTRAMUSCULAR at 10:10

## 2024-10-04 RX ADMIN — VANCOMYCIN HYDROCHLORIDE 2000 MG: 500 INJECTION, POWDER, LYOPHILIZED, FOR SOLUTION INTRAVENOUS at 01:10

## 2024-10-04 RX ADMIN — HYDROCODONE BITARTRATE AND ACETAMINOPHEN 1 TABLET: 7.5; 325 TABLET ORAL at 10:10

## 2024-10-04 RX ADMIN — HYDROCORTISONE: 10 OINTMENT TOPICAL at 10:10

## 2024-10-04 RX ADMIN — HYDROCODONE BITARTRATE AND ACETAMINOPHEN 1 TABLET: 7.5; 325 TABLET ORAL at 01:10

## 2024-10-04 RX ADMIN — CEFEPIME 1 G: 1 INJECTION, POWDER, FOR SOLUTION INTRAMUSCULAR; INTRAVENOUS at 12:10

## 2024-10-04 NOTE — PROGRESS NOTES
Pharmacokinetic Assessment Follow Up: IV Vancomycin    Vancomycin serum concentration assessment(s):    The trough level was drawn correctly and can be used to guide therapy at this time. The measurement is within the desired definitive target range of 10 to 20 mcg/mL.    Vancomycin Regimen Plan:    Continue current regimen of 2000 mg every 12 hours. The next serum trough concentration is scheduled for 10/6 at 1200 (prior to 1300 dose).    Scheduled Administration Times    0100  1300    Drug levels (last 3 results):  Recent Labs   Lab Result Units 10/04/24  1155   Vancomycin Trough ug/ml 17.3       Vancomycin Administrations:  vancomycin given in the last 96 hours                     vancomycin 2 g in dextrose 5 % 500 mL IVPB (mg) 2,000 mg New Bag 10/04/24 0100     2,000 mg New Bag 10/03/24 1246    vancomycin (VANCOCIN) 2,500 mg in D5W 500 mL IVPB (mg) 2,500 mg New Bag 10/03/24 0125                    Pharmacy will continue to follow and monitor vancomycin.    Please contact pharmacy at extension [phone] for questions regarding this assessment.    Thank you for the consult,   Aaron Ferguson       Patient brief summary:  Rene Alston is a 34 y.o. female initiated on antimicrobial therapy with IV Vancomycin for treatment of skin & soft tissue infection    The patient's current regimen is 2000 mg every 12 hours    Drug Allergies:   Review of patient's allergies indicates:   Allergen Reactions    Iodine Rash       Actual Body Weight:  Wt Readings from Last 1 Encounters:   10/03/24 127 kg (279 lb 15.8 oz)       Renal Function:   Estimated Creatinine Clearance: 144.1 mL/min (based on SCr of 0.81 mg/dL).    CBC (last 72 hours):  Recent Labs   Lab Result Units 10/02/24  1743 10/03/24  0713 10/04/24  0353   WBC x10(3)/mcL 11.33 9.84 8.85   Hgb g/dL 13.2 13.7 11.7*   Hct % 38.8 41.5 34.7*   Platelet x10(3)/mcL 287 270 218   Mono % % 4.6 6.0 7.7   Eos % % 3.2 4.7 4.3   Basophil % % 0.4 0.4 0.2       Metabolic Panel (last 72  hours):  Recent Labs   Lab Result Units 10/02/24  1743 10/03/24  0713 10/04/24  0353   Sodium mmol/L 139 138 138   Potassium mmol/L 4.1 3.8 3.8   Chloride mmol/L 103 104 109*   CO2 mmol/L 27 26 23   Glucose mg/dL 99 88 84   Blood Urea Nitrogen mg/dL 9.6 10.8 10.3   Creatinine mg/dL 0.82 0.80 0.81   Albumin g/dL 3.5 3.6 2.8*   Bilirubin Total mg/dL 0.5 0.4 0.2   ALP unit/L 91 106 88   AST unit/L 15 19 18   ALT unit/L 16 15 17       Microbiologic Results:  Microbiology Results (last 7 days)       Procedure Component Value Units Date/Time    Blood Culture [[id number]] Collected: 10/03/24 2137    Order Status: Resulted Specimen: Blood Updated: 10/03/24 2141    Blood Culture [[id number]] Collected: 10/03/24 2137    Order Status: Resulted Specimen: Blood Updated: 10/03/24 2141

## 2024-10-04 NOTE — CONSULTS
[facility] - 8th Floor Med Surg  Surgical Oncology  Consult Note    Patient Name: Rene Alston  MRN: [id number]  Code Status: Full Code  Admission Date: 10/2/2024  Hospital Length of Stay: 2 days  Attending Physician: Leni Woods MD  Primary Care Provider: Anu Primary Doctor    Inpatient consult to Breast Surgery  Consult performed by: Melanie Knight PA  Consult ordered by: Leni Woods MD        Subjective:     Chief Complaint/Reason for Admission: Right breast pain/abscess, chronic left breast abscess/fistula    History of Present Illness: Rene Alston is a 34 y.o. female who presented to [facility] ED 10/2 with right breast pain, swelling, and redness x's 3 weeks. 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  Her most recent procedure on the left breast was about 8 months ago at [facility] which seems to have been an I&D at the 10:00 periareolar position.  She states that her doctor has left the practice so she is unable to follow up. She has never had any problems on the right breast until 3 weeks ago when she developed pain and redness. She saw a provider through telehealth who prescribed Bactrim, but she did not have any improvement after 1 week and her antibiotic was changed to clindamycin.  Her right breast continued to worsen despite the oral antibiotics and she presented to the ED. Other than the fistula in the left breast, her left breast is currently asymptomatic.  Her vital signs were stable.  Blood cultures drawn.  She was started on IV vanc and admitted by hospitalist.  Breast surgery has been consulted for further management.  Patient states that since she was admitted for IV antibiotics, her right breast mass has significantly decreased in size.  She states it is currently the size of a lemon.    Patient is an everyday smoker, usually about 1 pack a cigarettes per  day and smokes medical marijuana twice per day.  She denies any chronic medical conditions, but she does report that she is undergoing workup/monitoring for possible smoldering or multiple myeloma with a doctor at [facility].  Her family history includes a mother with an unknown type of cancer who passed away after it  metastasized in her mid 40s.  Other than the breast procedures, her surgical history includes a D&C when she was 18 due to heavy menstrual bleeding. Reports that since she has had regular menstrual cycles with no heavy or prolonged bleeding.      She has had no recent mammograms, and thinks that the last one she had was in  at [facility] in [facility].  These reports/images are in her chart and were reviewed.  Bilateral diagnostic mammogram and left breast ultrasound on 2021 showed a focal fluid collection in the left breast which was favored to represent squamous metaplasia of lactiferous ducts versus abscess.  Needle biopsy was performed, which revealed acute and chronic inflammation around the squamous epithelium and keratin, consistent with the subareolar abscess.  Microbiology showed staph lugdunensis.  Patient states that after this needle biopsy was performed she required another I & D which is when her left breast fistula started.    OB/GYN History:  Age at Menarche Onset: 12  Menopausal Status: premenopausal, menstrual cycles regular  Hysterectomy/Oophorectomy: no, neither  Hormonal birth control (duration): yes, only when she was a teenager  Pregnancy History:   Age at first live birth: n/a  Hormone Replacement Therapy: No, none    Other:  MG breast density: unknown  Prior thoracic RT: none  Genetic testing: none  Ashkenazi Anabaptist descent: No    No current facility-administered medications on file prior to encounter.     Current Outpatient Medications on File Prior to Encounter   Medication Sig    HYDROcodone-acetaminophen (NORCO) 5-325 mg per tablet Take 1 tablet by mouth every 6  (six) hours as needed for Pain.       Review of patient's allergies indicates:   Allergen Reactions    Iodine Rash       No past medical history on file.  No past surgical history on file.  Family History    None       Tobacco Use    Smoking status: Not on file    Smokeless tobacco: Not on file   Substance and Sexual Activity    Alcohol use: Not on file    Drug use: Not on file    Sexual activity: Not on file     Review of Systems  Objective:     Vital Signs (Most Recent):  Temp: 97.9 °F (36.6 °C) (10/04/24 0729)  Pulse: (!) 45 (10/04/24 0729)  Resp: 16 (10/04/24 0729)  BP: 105/60 (10/04/24 0729)  SpO2: 100 % (10/04/24 0729) Vital Signs (24h Range):  Temp:  [97.7 °F (36.5 °C)-98 °F (36.7 °C)] 97.9 °F (36.6 °C)  Pulse:  [45-59] 45  Resp:  [16-18] 16  SpO2:  [98 %-100 %] 100 %  BP: (105-124)/(50-77) 105/60     Weight: 127 kg (279 lb 15.8 oz)  Body mass index is 39.05 kg/m².      Intake/Output Summary (Last 24 hours) at 10/4/2024 1011  Last data filed at 10/4/2024 0550  Gross per 24 hour   Intake 1500 ml   Output 400 ml   Net 1100 ml       Physical Exam  General: The patient is awake, alert and oriented times three. The patient is well nourished and in no acute distress.  Neck: There is no evidence of palpable cervical, supraclavicular or axillary adenopathy. The neck is supple. The thyroid is not enlarged.  Musculoskeletal: The patient has a normal range of motion of her bilateral upper extremities.  Chest: Examination of the chest wall fails to reveal any obvious abnormalities. Nonlabored breathing, symmetric expansion.  Breast:  Right: There is an erythematous hard tender subareolar mass consistent with breast abscess. No fluctuance. On US, it measures 28 mm but on exam, the mass is palpably larger (about 6 cm) due to surrounding inflammation. Otherwise, examination of right breast fails to reveal any dominant masses or areas of significant focal nodularity. The nipple is everted without evidence of discharge. There  is no skin dimpling with movement of the pectoralis. There are no significant skin changes overlying the breast.   Left: There is a well healed scar in the 3:00 axis from prior core needle biopsy to the left breast. In the 10:00 periareolar position, there is another scar with a draining fistula present. Examination of the left breast fails to reveal any dominant masses or areas of significant focal nodularity. The nipple is everted without evidence of discharge. There is no skin dimpling with movement of the pectoralis. There are no significant skin changes overlying the breast.  Abdomen: The abdomen is soft, flat, nontender and nondistended.  Integumentary: no rashes or skin lesions present  Neurologic: cranial nerves intact, no signs of peripheral neurological deficit, motor/sensory function intact    Significant Labs:  CBC:   Recent Labs   Lab 10/04/24  0353   WBC 8.85   RBC 3.53*   HGB 11.7*   HCT 34.7*      MCV 98.3*   MCH 33.1*   MCHC 33.7     CMP:   Recent Labs   Lab 10/04/24  0353   CALCIUM 7.9*   ALBUMIN 2.8*      K 3.8   CO2 23   *   BUN 10.3   CREATININE 0.81   ALKPHOS 88   ALT 17   AST 18   BILITOT 0.2     All pertinent labs from the last 24 hours have been reviewed.    Significant Diagnostics:  U/S: I have reviewed all pertinent results/findings within the past 24 hours.  Ultrasound of the chest directed to the area of pain and swelling on her right breast. Imaging of the inferior right breast identifies a hypoechoic complex fluid collection measuring 24 x 21 x 28 mm.     Assessment/Plan:     Patient Active Problem List    Diagnosis Date Noted    Abscess of right breast 10/04/2024    Chronic abscess of breast 10/04/2024    Breast pain 10/03/2024        Patient's presentation is most consistent with chronic left breast abscesses/fistula and now with periductal mastitis on the right breast which has now developed into a right breast abscess. In cases such as this with  recurrence/chronic periductal mastitis with abscesses or fistulas that do not heal despite multiple antibiotics and I&Ds, an operation to completely remove the affected area is required for definitive management.    Discussed options of management with patient:   1) Right Breast I&D at bedside vs in OR today/tomorrow. Patient refuses bedside I&D and has eaten today. Discussed if opts for right side I&D, would need to eventually take patient back for another surgery at a later date to have bilateral central duct excisions for definitive management.  2) Given good response to IV antibiotics, may continue antibiotics over the weekend to decrease size of abscess over the next few days. If she continues with this good response and abscess shrinks enough, would perform bilateral central duct excision possibly next week for definitive management. Dicussed should she be found to not continue response to IV antibiotics, would need to perform right breast I&D and then eventually take back for central duct excision at a later date.    ----------------------------------------------------------------------------------------------------------------------------     After options of management including risks/benefits were discussed, patient opted for option # 2 explained above (continued IV antibiotics over the weekend to decrease size of abscess over the next few days with eventual plans for surgery).     Breast Surgery team will continue to round on patient daily to monitor response to therapy. If she continues with this good response and abscess shrinks enough, would perform bilateral central duct excision possibly next week for definitive management. Dicussed should she be found to not continue response to IV antibiotics, would need to perform right breast I&D and then eventually take back for central duct excision at a later date.    Patient may continue eat today but will place on NPO again at midnight in case symptoms  worsen and surgery is needed tomorrow.    Advised smoking cessation explaining that smokers have an increased risk of periductal mastitis and cause recurrences due to chronic damage to breast ducts.    Continue vancomycin and CBC/CMP labs.    After discharge, will need follow up BL DG MG and US. This is an outpatient exam and will order this at time of discharge.         Thank you for your consult. We will follow-up with patient. Please contact us if you have any additional questions.    JENNIFER Oates  Breast Surgical Oncology  [facility] - 8th Floor Med Surg

## 2024-10-04 NOTE — PLAN OF CARE
10/03/24 1600   Discharge Assessment   Assessment Type Discharge Planning Assessment   Confirmed/corrected address, phone number and insurance Yes   Confirmed Demographics Correct on Facesheet   Source of Information patient   Communicated YADIEL with patient/caregiver Date not available/Unable to determine   Reason For Admission Breast Pain   People in Home alone   Do you expect to return to your current living situation? Yes   Do you have help at home or someone to help you manage your care at home? No   Prior to hospitilization cognitive status: Unable to Assess   Current cognitive status: Alert/Oriented   Walking or Climbing Stairs Difficulty no   Dressing/Bathing Difficulty no   Home Accessibility wheelchair accessible   Home Layout Able to live on 1st floor   Equipment Currently Used at Home none   Do you currently have service(s) that help you manage your care at home? No   Do you have prescription coverage? Yes   Coverage BCBS   Who is going to help you get home at discharge? [employer]   How do you get to doctors appointments? public transportation;health plan transportation   Are you on dialysis? No   Do you take coumadin? No   Discharge Plan A Home   Discharge Plan B Home Health   DME Needed Upon Discharge  none   Discharge Plan discussed with: Patient   Transition of Care Barriers None   Physical Activity   On average, how many days per week do you engage in moderate to strenuous exercise (like a brisk walk)? 0 days   On average, how many minutes do you engage in exercise at this level? 0 min   Social Isolation   How often do you feel lonely or isolated from those around you?  Never   Alcohol Use   Q1: How often do you have a drink containing alcohol? Never   Q2: How many drinks containing alcohol do you have on a typical day when you are drinking? None     Patient lives alone and is not current with any home health agency. She does not have a PCP at this time. Rece[facility] two  months. 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 She will require transportation assistance upon dc. [place] referral sent via [facility] yesterday.

## 2024-10-04 NOTE — PROGRESS NOTES
[facility] Hospital Medicine Progress Note        Chief Complaint: Inpatient Follow-up    HPI:     34-year-old  female with significant history of recurrent breast cellulitis/abscess in the past . patient has had mammogram which was reportedly benign and has had left breast biopsy which was reportedly benign.  She does follow up with Oncology as outpatient and reports that she is currently being worked up for ?  Leukemia.  Patient presented to the ED with complaints of right breast swelling, pain, erythema.  Was recently treated with clindamycin/Bactrim for left breast cellulitis.  Afebrile and stable hemodynamics in the ED. labs stable.  Admitted to hospital medicine services and was initiated on IV vancomycin, ultrasound soft tissue breast was ordered to rule out abscess.  Also decided to consult breast surgeon given her history of recurrent abscess.  Antibiotics broadened to cefepime, vancomycin, bradycardic, however asymptomatic, ordered TTE    Interval Hx:     Patient seen at bedside, comfortably sitting in the couch, still has right breast pain, erythema and swelling, hemodynamics stable except for sinus bradycardia, but asymptomatic from the same, patient is afebrile.    Objective/physical exam:  General: In no acute distress, afebrile  Chest: Clear to auscultation bilaterally, right breast swollen, tender  Heart: S1, S2, no appreciable murmur  Abdomen: Soft, nontender, BS +  MSK: Warm, no lower extremity edema, no clubbing or cyanosis  Neurologic: Alert and oriented x4, moving all extremities with good strength     VITAL SIGNS: 24 HRS MIN & MAX LAST   Temp  Min: 97.7 °F (36.5 °C)  Max: 98 °F (36.7 °C) 97.9 °F (36.6 °C)   BP  Min: 105/60  Max: 124/68 105/60   Pulse  Min: 45  Max: 59  (!) 45   Resp  Min: 16  Max: 18 16   SpO2  Min: 98 %  Max: 100 % 100 %       Recent Labs   Lab 10/04/24  0353   WBC 8.85   RBC 3.53*   HGB 11.7*   HCT 34.7*   MCV 98.3*   MCH 33.1*    MCHC 33.7   RDW 12.8      MPV 10.5*         Recent Labs   Lab 10/04/24  0353      K 3.8   *   CO2 23   BUN 10.3   CREATININE 0.81   CALCIUM 7.9*   ALBUMIN 2.8*   ALKPHOS 88   ALT 17   AST 18   BILITOT 0.2          Microbiology Results (last 7 days)       Procedure Component Value Units Date/Time    Blood Culture [[id number]] Collected: 10/03/24 2137    Order Status: Resulted Specimen: Blood Updated: 10/03/24 2141    Blood Culture [[id number]] Collected: 10/03/24 2137    Order Status: Resulted Specimen: Blood Updated: 10/03/24 2141             Scheduled Med:   ceFEPime IV (PEDS and ADULTS)  1 g Intravenous Q8H    enoxparin  40 mg Subcutaneous Daily    nicotine  1 patch Transdermal Daily    vancomycin (VANCOCIN) IV (PEDS and ADULTS)  2,000 mg Intravenous Q12H          Assessment/Plan:    Right breast cellulitis with underlying abscess  Suspected periductal mastitis  History of recurrent bilateral breast cellulitis with abscess requiring intervention  Asymptomatic sinus bradycardia   Chronic tobacco use  Prophylaxis      Ultrasound confirmed abscess  Patient is currently on IV cefepime, vancomycin   I have also consulted breast surgeon   Case discussed with breast surgeon PA  High suspicion for periductal mastitis   Incision and drainage alone will not be beneficial and the patient will benefit from central duct excision bilaterally  Plan currently is to continue IV antibiotics over the weekend and plan for the procedure possibly early next week  Follow up blood cultures  Echocardiogram ordered 10/3 to further evaluate bradycardia   Patient is asymptomatic   Continue nicotine patch   Reports being worked up for leukemia, blood counts normal, continue outpatient follow up with Hematology/Oncology  P.r.n. narcotics for pain associated with cellulitis/abscess  DVT prophylaxis-subQ Lovenox      Leni Woods MD   10/04/2024

## 2024-10-05 LAB
ALBUMIN SERPL-MCNC: 2.8 G/DL (ref 3.5–5)
ALBUMIN/GLOB SERPL: 0.9 RATIO (ref 1.1–2)
ALP SERPL-CCNC: 83 UNIT/L (ref 40–150)
ALT SERPL-CCNC: 16 UNIT/L (ref 0–55)
ANION GAP SERPL CALC-SCNC: 5 MEQ/L
AST SERPL-CCNC: 15 UNIT/L (ref 5–34)
BASOPHILS # BLD AUTO: 0.03 X10(3)/MCL
BASOPHILS NFR BLD AUTO: 0.4 %
BILIRUB SERPL-MCNC: 0.4 MG/DL
BUN SERPL-MCNC: 10.1 MG/DL (ref 7–18.7)
CALCIUM SERPL-MCNC: 8.2 MG/DL (ref 8.4–10.2)
CHLORIDE SERPL-SCNC: 107 MMOL/L (ref 98–107)
CO2 SERPL-SCNC: 26 MMOL/L (ref 22–29)
CREAT SERPL-MCNC: 0.76 MG/DL (ref 0.55–1.02)
CREAT/UREA NIT SERPL: 13
EOSINOPHIL # BLD AUTO: 0.38 X10(3)/MCL (ref 0–0.9)
EOSINOPHIL NFR BLD AUTO: 4.5 %
ERYTHROCYTE [DISTWIDTH] IN BLOOD BY AUTOMATED COUNT: 12.8 % (ref 11.5–17)
GFR SERPLBLD CREATININE-BSD FMLA CKD-EPI: >60 ML/MIN/1.73/M2
GLOBULIN SER-MCNC: 3 GM/DL (ref 2.4–3.5)
GLUCOSE SERPL-MCNC: 84 MG/DL (ref 74–100)
HCT VFR BLD AUTO: 34.7 % (ref 37–47)
HGB BLD-MCNC: 11.2 G/DL (ref 12–16)
IMM GRANULOCYTES # BLD AUTO: 0.03 X10(3)/MCL (ref 0–0.04)
IMM GRANULOCYTES NFR BLD AUTO: 0.4 %
LYMPHOCYTES # BLD AUTO: 2.34 X10(3)/MCL (ref 0.6–4.6)
LYMPHOCYTES NFR BLD AUTO: 27.6 %
MCH RBC QN AUTO: 32.3 PG (ref 27–31)
MCHC RBC AUTO-ENTMCNC: 32.3 G/DL (ref 33–36)
MCV RBC AUTO: 100 FL (ref 80–94)
MONOCYTES # BLD AUTO: 0.62 X10(3)/MCL (ref 0.1–1.3)
MONOCYTES NFR BLD AUTO: 7.3 %
NEUTROPHILS # BLD AUTO: 5.09 X10(3)/MCL (ref 2.1–9.2)
NEUTROPHILS NFR BLD AUTO: 59.8 %
NRBC BLD AUTO-RTO: 0 %
PLATELET # BLD AUTO: 234 X10(3)/MCL (ref 130–400)
PMV BLD AUTO: 10.4 FL (ref 7.4–10.4)
POTASSIUM SERPL-SCNC: 4.4 MMOL/L (ref 3.5–5.1)
PROT SERPL-MCNC: 5.8 GM/DL (ref 6.4–8.3)
RBC # BLD AUTO: 3.47 X10(6)/MCL (ref 4.2–5.4)
SODIUM SERPL-SCNC: 138 MMOL/L (ref 136–145)
WBC # BLD AUTO: 8.49 X10(3)/MCL (ref 4.5–11.5)

## 2024-10-05 PROCEDURE — 25000003 PHARM REV CODE 250: Performed by: INTERNAL MEDICINE

## 2024-10-05 PROCEDURE — S4991 NICOTINE PATCH NONLEGEND: HCPCS | Performed by: INTERNAL MEDICINE

## 2024-10-05 PROCEDURE — 80053 COMPREHEN METABOLIC PANEL: CPT | Performed by: INTERNAL MEDICINE

## 2024-10-05 PROCEDURE — 99232 SBSQ HOSP IP/OBS MODERATE 35: CPT | Mod: ,,,

## 2024-10-05 PROCEDURE — 25000003 PHARM REV CODE 250: Performed by: PHYSICIAN ASSISTANT

## 2024-10-05 PROCEDURE — 63600175 PHARM REV CODE 636 W HCPCS: Performed by: INTERNAL MEDICINE

## 2024-10-05 PROCEDURE — 36415 COLL VENOUS BLD VENIPUNCTURE: CPT | Performed by: INTERNAL MEDICINE

## 2024-10-05 PROCEDURE — 85025 COMPLETE CBC W/AUTO DIFF WBC: CPT | Performed by: INTERNAL MEDICINE

## 2024-10-05 PROCEDURE — 11000001 HC ACUTE MED/SURG PRIVATE ROOM

## 2024-10-05 RX ORDER — KETOROLAC TROMETHAMINE 30 MG/ML
15 INJECTION, SOLUTION INTRAMUSCULAR; INTRAVENOUS EVERY 6 HOURS PRN
Status: DISPENSED | OUTPATIENT
Start: 2024-10-05 | End: 2024-10-06

## 2024-10-05 RX ORDER — MORPHINE SULFATE 4 MG/ML
2 INJECTION, SOLUTION INTRAMUSCULAR; INTRAVENOUS EVERY 4 HOURS PRN
Status: DISCONTINUED | OUTPATIENT
Start: 2024-10-05 | End: 2024-10-08 | Stop reason: HOSPADM

## 2024-10-05 RX ORDER — HYDROCODONE BITARTRATE AND ACETAMINOPHEN 10; 325 MG/1; MG/1
1 TABLET ORAL EVERY 4 HOURS PRN
Status: DISCONTINUED | OUTPATIENT
Start: 2024-10-05 | End: 2024-10-08 | Stop reason: HOSPADM

## 2024-10-05 RX ADMIN — PIPERACILLIN AND TAZOBACTAM 4.5 G: 4; .5 INJECTION, POWDER, LYOPHILIZED, FOR SOLUTION INTRAVENOUS; PARENTERAL at 01:10

## 2024-10-05 RX ADMIN — MORPHINE SULFATE 2 MG: 4 INJECTION, SOLUTION INTRAMUSCULAR; INTRAVENOUS at 09:10

## 2024-10-05 RX ADMIN — NICOTINE 1 PATCH: 21 PATCH, EXTENDED RELEASE TRANSDERMAL at 11:10

## 2024-10-05 RX ADMIN — KETOROLAC TROMETHAMINE 15 MG: 30 INJECTION, SOLUTION INTRAMUSCULAR at 05:10

## 2024-10-05 RX ADMIN — HYDROCODONE BITARTRATE AND ACETAMINOPHEN 1 TABLET: 7.5; 325 TABLET ORAL at 11:10

## 2024-10-05 RX ADMIN — KETOROLAC TROMETHAMINE 15 MG: 30 INJECTION, SOLUTION INTRAMUSCULAR at 11:10

## 2024-10-05 RX ADMIN — VANCOMYCIN HYDROCHLORIDE 2000 MG: 500 INJECTION, POWDER, LYOPHILIZED, FOR SOLUTION INTRAVENOUS at 03:10

## 2024-10-05 RX ADMIN — ENOXAPARIN SODIUM 40 MG: 40 INJECTION SUBCUTANEOUS at 05:10

## 2024-10-05 RX ADMIN — MORPHINE SULFATE 2 MG: 4 INJECTION, SOLUTION INTRAMUSCULAR; INTRAVENOUS at 01:10

## 2024-10-05 RX ADMIN — CEFEPIME 1 G: 1 INJECTION, POWDER, FOR SOLUTION INTRAMUSCULAR; INTRAVENOUS at 05:10

## 2024-10-05 RX ADMIN — VANCOMYCIN HYDROCHLORIDE 2000 MG: 500 INJECTION, POWDER, LYOPHILIZED, FOR SOLUTION INTRAVENOUS at 01:10

## 2024-10-05 RX ADMIN — HYDROCODONE BITARTRATE AND ACETAMINOPHEN 1 TABLET: 7.5; 325 TABLET ORAL at 05:10

## 2024-10-05 RX ADMIN — KETOROLAC TROMETHAMINE 15 MG: 30 INJECTION, SOLUTION INTRAMUSCULAR at 06:10

## 2024-10-05 RX ADMIN — PIPERACILLIN AND TAZOBACTAM 4.5 G: 4; .5 INJECTION, POWDER, LYOPHILIZED, FOR SOLUTION INTRAVENOUS; PARENTERAL at 09:10

## 2024-10-05 RX ADMIN — HYDROCODONE BITARTRATE AND ACETAMINOPHEN 1 TABLET: 10; 325 TABLET ORAL at 06:10

## 2024-10-05 RX ADMIN — ONDANSETRON 4 MG: 2 INJECTION INTRAMUSCULAR; INTRAVENOUS at 09:10

## 2024-10-05 RX ADMIN — HYDROCODONE BITARTRATE AND ACETAMINOPHEN 1 TABLET: 7.5; 325 TABLET ORAL at 01:10

## 2024-10-05 NOTE — PROGRESS NOTES
[facility] Hospital Medicine Progress Note        Chief Complaint: Inpatient Follow-up    HPI:     34-year-old  female with significant history of recurrent breast cellulitis/abscess in the past . patient has had mammogram which was reportedly benign and has had left breast biopsy which was reportedly benign.  She does follow up with Oncology as outpatient and reports that she is currently being worked up for ?  Leukemia.  Patient presented to the ED with complaints of right breast swelling, pain, erythema.  Was recently treated with clindamycin/Bactrim for left breast cellulitis.  Afebrile and stable hemodynamics in the ED. labs stable.  Admitted to hospital medicine services and was initiated on IV vancomycin, ultrasound soft tissue breast was ordered to rule out abscess.  Also decided to consult breast surgeon given her history of recurrent abscess.  Antibiotics broadened to cefepime, vancomycin, bradycardic, however asymptomatic, ordered TTE.  TTE unremarkable except for diastolic dysfunction.  Evaluated by breast surgery on 10/04, concern for periductal mastitis, will need duct excision for symptomatic resolution, timing to be decided, ultrasound confirmed right breast abscess.    Interval Hx:     Patient seen at bedside, right breast swelling, erythema and pain is significantly worse today, patient is afebrile, hemodynamically stable except for mild bradycardia, no other new complaints   Objective/physical exam:  General: In no acute distress, afebrile  Chest: Clear to auscultation bilaterally, right breast swollen, tender and erythematous-worse today  Heart: S1, S2, no appreciable murmur  Abdomen: Soft, nontender, BS +  MSK: Warm, no lower extremity edema, no clubbing or cyanosis  Neurologic: Alert and oriented x4, moving all extremities with good strength     VITAL SIGNS: 24 HRS MIN & MAX LAST   Temp  Min: 97.6 °F (36.4 °C)  Max: 98.6 °F (37 °C) 97.6 °F (36.4  °C)   BP  Min: 98/71  Max: 138/69 113/68   Pulse  Min: 51  Max: 66  (!) 51   Resp  Min: 16  Max: 20 17   SpO2  Min: 96 %  Max: 100 % 99 %       Recent Labs   Lab 10/05/24  0528   WBC 8.49   RBC 3.47*   HGB 11.2*   HCT 34.7*   .0*   MCH 32.3*   MCHC 32.3*   RDW 12.8      MPV 10.4         Recent Labs   Lab 10/05/24  0528      K 4.4      CO2 26   BUN 10.1   CREATININE 0.76   CALCIUM 8.2*   ALBUMIN 2.8*   ALKPHOS 83   ALT 16   AST 15   BILITOT 0.4          Microbiology Results (last 7 days)       Procedure Component Value Units Date/Time    Blood Culture [[id number]]  (Normal) Collected: 10/03/24 2137    Order Status: Completed Specimen: Blood Updated: 10/04/24 2300     Blood Culture No Growth At 24 Hours    Blood Culture [[id number]]  (Normal) Collected: 10/03/24 2137    Order Status: Completed Specimen: Blood Updated: 10/04/24 2300     Blood Culture No Growth At 24 Hours             Scheduled Med:   ceFEPime IV (PEDS and ADULTS)  1 g Intravenous Q8H    enoxparin  40 mg Subcutaneous Daily    nicotine  1 patch Transdermal Daily    vancomycin (VANCOCIN) IV (PEDS and ADULTS)  2,000 mg Intravenous Q12H          Assessment/Plan:    Right breast cellulitis with underlying abscess-worse today  Suspected periductal mastitis  History of recurrent bilateral breast cellulitis with abscess requiring intervention  Asymptomatic sinus bradycardia   Chronic tobacco use  Prophylaxis      Ultrasound confirmed abscess  Patient is currently on IV cefepime, vancomycin  Symptomatically worse today with worsening redness, swelling and pain with tenderness  I will switch antibiotics to Zosyn and vancomycin   No plans for intervention today per breast surgery, I have reached out to them about worsening complaints   High suspicion for periductal mastitis   Incision and drainage alone will not be beneficial and the patient will benefit from central duct excision bilaterally  Timing to be decided by breast surgery  Blood  cultures negative   Afebrile  Echocardiogram ordered 10/3 to further evaluate bradycardia   Patient is asymptomatic   Continue nicotine patch   Reports being worked up for leukemia, blood counts normal, continue outpatient follow up with Hematology/Oncology  P.r.n. narcotics for pain associated with cellulitis/abscess  Norco increased to 10 and added morphine for severe pain  DVT prophylaxis-subQ Lovenox      Leni Woods MD   10/05/2024

## 2024-10-05 NOTE — PROGRESS NOTES
"[facility] - 8th Floor Med Surg  Surgical Oncology  Progress Note    Patient Name: Rene Alston  MRN: [id number]  Code Status: Full Code  Admission Date: 10/2/2024  Hospital Length of Stay: 3 days  Attending Physician: Leni Woods MD  Primary Care Provider: Anu, Primary Doctor    Consults  Subjective:     Chief Complaint/Reason for Admission: Right breast pain/abscess, chronic left breast abscess/fistula    Interval: 10/05/2024 Patient doing okay this morning. Vital signs stable overnight. Patient still c/o of right breast pain. She has been using warm compresses on the area. She reports a "head" did form near her right nipple since yesterday. No drainage from the area. Patient otherwise doing well. She is still refusing drainage of abscess as bedside. No other significant interval changes.       History of Present Illness: Rene Alston is a 34 y.o. female who presented to [facility] ED 10/2 with right breast pain, swelling, and redness x's 3 weeks. 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  Her most recent procedure on the left breast was about 8 months ago at [facility] which seems to have been an I&D at the 10:00 periareolar position.  She states that her doctor has left the practice so she is unable to follow up. She has never had any problems on the right breast until 3 weeks ago when she developed pain and redness. She saw a provider through telehealth who prescribed Bactrim, but she did not have any improvement after 1 week and her antibiotic was changed to clindamycin.  Her right breast continued to worsen despite the oral antibiotics and she presented to the ED. Other than the fistula in the left breast, her left breast is currently asymptomatic.  Her vital signs were stable.  Blood cultures drawn.  She was started on IV vanc and admitted by hospitalist.  Breast surgery has been " consulted for further management.  Patient states that since she was admitted for IV antibiotics, her right breast mass has significantly decreased in size.  She states it is currently the size of a lemon.    Patient is an everyday smoker, usually about 1 pack a cigarettes per day and smokes medical marijuana twice per day.  She denies any chronic medical conditions, but she does report that she is undergoing workup/monitoring for possible smoldering or multiple myeloma with a doctor at [facility].  Her family history includes a mother with an unknown type of cancer who passed away after it  metastasized in her mid 40s.  Other than the breast procedures, her surgical history includes a D&C when she was 18 due to heavy menstrual bleeding. Reports that since she has had regular menstrual cycles with no heavy or prolonged bleeding.      She has had no recent mammograms, and thinks that the last one she had was in  at [place] in [facility].  These reports/images are in her chart and were reviewed.  Bilateral diagnostic mammogram and left breast ultrasound on 2021 showed a focal fluid collection in the left breast which was favored to represent squamous metaplasia of lactiferous ducts versus abscess.  Needle biopsy was performed, which revealed acute and chronic inflammation around the squamous epithelium and keratin, consistent with the subareolar abscess.  Microbiology showed staph lugdunensis.  Patient states that after this needle biopsy was performed she required another I & D which is when her left breast fistula started.    OB/GYN History:  Age at Menarche Onset: 12  Menopausal Status: premenopausal, menstrual cycles regular  Hysterectomy/Oophorectomy: no, neither  Hormonal birth control (duration): yes, only when she was a teenager  Pregnancy History:   Age at first live birth: n/a  Hormone Replacement Therapy: No, none    Other:  MG breast density: unknown  Prior thoracic RT: none  Genetic testing:  none  Ashkenazi Gnosticism descent: No    No current facility-administered medications on file prior to encounter.     Current Outpatient Medications on File Prior to Encounter   Medication Sig    HYDROcodone-acetaminophen (NORCO) 5-325 mg per tablet Take 1 tablet by mouth every 6 (six) hours as needed for Pain.       Review of patient's allergies indicates:   Allergen Reactions    Iodine Rash       No past medical history on file.  No past surgical history on file.  Family History    None       Tobacco Use    Smoking status: Not on file    Smokeless tobacco: Not on file   Substance and Sexual Activity    Alcohol use: Not on file    Drug use: Not on file    Sexual activity: Not on file     Review of Systems   Constitutional:  Negative for chills and fever.   Respiratory:  Negative for shortness of breath.    Cardiovascular:  Negative for chest pain.   Gastrointestinal:  Positive for nausea. Negative for vomiting.   Genitourinary:  Negative for dysuria and hematuria.   Musculoskeletal:  Negative for myalgias.   Neurological:  Negative for headaches.   Psychiatric/Behavioral:  Negative for confusion.    All other pertinent history mentioned in HPI.     Objective:     Vital Signs (Most Recent):  Temp: 97.6 °F (36.4 °C) (10/05/24 0759)  Pulse: (!) 51 (10/05/24 0759)  Resp: 17 (10/05/24 0531)  BP: 113/68 (10/05/24 0759)  SpO2: 99 % (10/05/24 0759) Vital Signs (24h Range):  Temp:  [97.6 °F (36.4 °C)-98.6 °F (37 °C)] 97.6 °F (36.4 °C)  Pulse:  [51-66] 51  Resp:  [16-20] 17  SpO2:  [96 %-100 %] 99 %  BP: ()/(66-71) 113/68     Weight: 127 kg (279 lb 15.8 oz)  Body mass index is 39.05 kg/m².      Intake/Output Summary (Last 24 hours) at 10/5/2024 0833  Last data filed at 10/5/2024 0600  Gross per 24 hour   Intake 1872 ml   Output 400 ml   Net 1472 ml       Physical Exam  General: The patient is awake, alert and oriented times three. The patient is well nourished and in no acute distress.  Neck: There is no evidence of  palpable cervical, supraclavicular or axillary adenopathy. The neck is supple. The thyroid is not enlarged.  Musculoskeletal: The patient has a normal range of motion of her bilateral upper extremities.  Chest: Examination of the chest wall fails to reveal any obvious abnormalities. Nonlabored breathing, symmetric expansion.  Breast:  Right: There is an erythematous hard tender subareolar mass consistent with breast abscess. No fluctuance. On US, it measures 28 mm but on exam, the mass is palpably larger (about 6 cm) due to surrounding inflammation. There is a small protuberance noted inferior to nipple. No drainage from the area noted. Otherwise, examination of right breast fails to reveal any dominant masses or areas of significant focal nodularity. The nipple is everted. There is no skin dimpling with movement of the pectoralis. There are no significant skin changes overlying the breast.   Left: There is a well healed scar in the 3:00 axis from prior core needle biopsy to the left breast. In the 10:00 periareolar position, there is another scar with a draining fistula present. Examination of the left breast fails to reveal any dominant masses or areas of significant focal nodularity. The nipple is everted without evidence of discharge. There is no skin dimpling with movement of the pectoralis. There are no significant skin changes overlying the breast.  Abdomen: The abdomen is soft, flat, nontender and nondistended.  Integumentary: no rashes or skin lesions present  Neurologic: cranial nerves intact, no signs of peripheral neurological deficit, motor/sensory function intact    Significant Labs:  CBC:   Recent Labs   Lab 10/05/24  0528   WBC 8.49   RBC 3.47*   HGB 11.2*   HCT 34.7*      .0*   MCH 32.3*   MCHC 32.3*     CMP:   Recent Labs   Lab 10/05/24  0528   CALCIUM 8.2*   ALBUMIN 2.8*      K 4.4   CO2 26      BUN 10.1   CREATININE 0.76   ALKPHOS 83   ALT 16   AST 15   BILITOT 0.4      All pertinent labs from the last 24 hours have been reviewed.    Significant Diagnostics:  U/S: I have reviewed all pertinent results/findings within the past 24 hours.  Ultrasound of the chest directed to the area of pain and swelling on her right breast. Imaging of the inferior right breast identifies a hypoechoic complex fluid collection measuring 24 x 21 x 28 mm.     Assessment/Plan:     Patient Active Problem List    Diagnosis Date Noted    Abscess of right breast 10/04/2024    Chronic abscess of breast 10/04/2024    Breast pain 10/03/2024        Patient's presentation is most consistent with chronic left breast abscesses/fistula and now with periductal mastitis on the right breast which has now developed into a right breast abscess. In cases such as this with recurrence/chronic periductal mastitis with abscesses or fistulas that do not heal despite multiple antibiotics and I&Ds, an operation to completely remove the affected area is required for definitive management.    Discussed options of management with patient:   1) Right Breast I&D at bedside vs in OR today/tomorrow. Patient refuses bedside I&D and has eaten today. Discussed if opts for right side I&D, would need to eventually take patient back for another surgery at a later date to have bilateral central duct excisions for definitive management.  2) Given good response to IV antibiotics, may continue antibiotics over the weekend to decrease size of abscess over the next few days. If she continues with this good response and abscess shrinks enough, would perform bilateral central duct excision possibly next week for definitive management. Dicussed should she be found to not continue response to IV antibiotics, would need to perform right breast I&D and then eventually take back for central duct excision at a later date.    ----------------------------------------------------------------------------------------------------------------------------      After options of management including risks/benefits were discussed, patient opted for option # 2 explained above (continued IV antibiotics over the weekend to decrease size of abscess over the next few days with eventual plans for surgery).     Breast Surgery team will continue to round on patient daily to monitor response to therapy. If she continues with this good response and abscess shrinks enough, would perform bilateral central duct excision possibly next week for definitive management. Dicussed should she be found to not continue response to IV antibiotics, would need to perform right breast I&D and then eventually take back for central duct excision at a later date.    Continue warm compresses on right breast to aid in drainage of right breast abscess.     Patient may continue eat today but will place on NPO again at midnight in case symptoms worsen and surgery is needed tomorrow.    Advised smoking cessation explaining that smokers have an increased risk of periductal mastitis and cause recurrences due to chronic damage to breast ducts.    Continue vancomycin and CBC/CMP labs.    After discharge, will need follow up BL DG MG and US. This is an outpatient exam and will order this at time of discharge.         Thank you for your consult. We will follow-up with patient. Please contact us if you have any additional questions.    Karena Ford PA-C  Breast Surgical Oncology  [facility] - 8th Floor Med Surg

## 2024-10-05 NOTE — PLAN OF CARE
Problem: Adult Inpatient Plan of Care  Goal: Plan of Care Review  Outcome: Progressing  Goal: Patient-Specific Goal (Individualized)  Outcome: Progressing  Goal: Absence of Hospital-Acquired Illness or Injury  Outcome: Progressing  Goal: Optimal Comfort and Wellbeing  Outcome: Progressing  Goal: Readiness for Transition of Care  Outcome: Progressing     Problem: Fall Injury Risk  Goal: Absence of Fall and Fall-Related Injury  Outcome: Progressing     Problem: Wound  Goal: Optimal Coping  Reactivated  Goal: Optimal Functional Ability  Reactivated  Goal: Absence of Infection Signs and Symptoms  Reactivated  Goal: Improved Oral Intake  Reactivated  Goal: Optimal Pain Control and Function  Reactivated  Goal: Skin Health and Integrity  Reactivated  Goal: Optimal Wound Healing  Reactivated

## 2024-10-06 ENCOUNTER — ANESTHESIA (OUTPATIENT)
Dept: SURGERY | Facility: HOSPITAL | Age: 34
End: 2024-10-06
Payer: COMMERCIAL

## 2024-10-06 ENCOUNTER — ANESTHESIA EVENT (OUTPATIENT)
Dept: SURGERY | Facility: HOSPITAL | Age: 34
End: 2024-10-06
Payer: COMMERCIAL

## 2024-10-06 LAB
GRAM STN SPEC: NORMAL
GRAM STN SPEC: NORMAL
VANCOMYCIN SERPL-MCNC: 35.3 UG/ML (ref 15–20)
VANCOMYCIN TROUGH SERPL-MCNC: 38.8 UG/ML (ref 15–20)

## 2024-10-06 PROCEDURE — 63600175 PHARM REV CODE 636 W HCPCS

## 2024-10-06 PROCEDURE — 25000003 PHARM REV CODE 250: Performed by: INTERNAL MEDICINE

## 2024-10-06 PROCEDURE — 63600175 PHARM REV CODE 636 W HCPCS: Mod: JZ,JG | Performed by: SURGERY

## 2024-10-06 PROCEDURE — 87075 CULTR BACTERIA EXCEPT BLOOD: CPT | Performed by: SURGERY

## 2024-10-06 PROCEDURE — 71000033 HC RECOVERY, INTIAL HOUR: Performed by: SURGERY

## 2024-10-06 PROCEDURE — 36000705 HC OR TIME LEV I EA ADD 15 MIN: Performed by: SURGERY

## 2024-10-06 PROCEDURE — 63600175 PHARM REV CODE 636 W HCPCS: Performed by: ANESTHESIOLOGY

## 2024-10-06 PROCEDURE — D9220A PRA ANESTHESIA: Mod: ,,,

## 2024-10-06 PROCEDURE — 80202 ASSAY OF VANCOMYCIN: CPT | Performed by: INTERNAL MEDICINE

## 2024-10-06 PROCEDURE — 63600175 PHARM REV CODE 636 W HCPCS: Performed by: INTERNAL MEDICINE

## 2024-10-06 PROCEDURE — 99232 SBSQ HOSP IP/OBS MODERATE 35: CPT | Mod: ,,,

## 2024-10-06 PROCEDURE — 0H9T0ZZ DRAINAGE OF RIGHT BREAST, OPEN APPROACH: ICD-10-PCS | Performed by: SURGERY

## 2024-10-06 PROCEDURE — 25000003 PHARM REV CODE 250

## 2024-10-06 PROCEDURE — 63600175 PHARM REV CODE 636 W HCPCS: Performed by: NURSE PRACTITIONER

## 2024-10-06 PROCEDURE — 37000008 HC ANESTHESIA 1ST 15 MINUTES: Performed by: SURGERY

## 2024-10-06 PROCEDURE — 87205 SMEAR GRAM STAIN: CPT | Performed by: SURGERY

## 2024-10-06 PROCEDURE — 11000001 HC ACUTE MED/SURG PRIVATE ROOM

## 2024-10-06 PROCEDURE — 37000009 HC ANESTHESIA EA ADD 15 MINS: Performed by: SURGERY

## 2024-10-06 PROCEDURE — 36415 COLL VENOUS BLD VENIPUNCTURE: CPT | Performed by: INTERNAL MEDICINE

## 2024-10-06 PROCEDURE — 99499 UNLISTED E&M SERVICE: CPT | Mod: ,,,

## 2024-10-06 PROCEDURE — 87070 CULTURE OTHR SPECIMN AEROBIC: CPT | Performed by: SURGERY

## 2024-10-06 PROCEDURE — 36000704 HC OR TIME LEV I 1ST 15 MIN: Performed by: SURGERY

## 2024-10-06 PROCEDURE — S4991 NICOTINE PATCH NONLEGEND: HCPCS | Performed by: INTERNAL MEDICINE

## 2024-10-06 RX ORDER — HYDROMORPHONE HYDROCHLORIDE 2 MG/ML
0.2 INJECTION, SOLUTION INTRAMUSCULAR; INTRAVENOUS; SUBCUTANEOUS EVERY 5 MIN PRN
Status: DISCONTINUED | OUTPATIENT
Start: 2024-10-06 | End: 2024-10-06 | Stop reason: HOSPADM

## 2024-10-06 RX ORDER — FENTANYL CITRATE 50 UG/ML
INJECTION, SOLUTION INTRAMUSCULAR; INTRAVENOUS
Status: DISCONTINUED | OUTPATIENT
Start: 2024-10-06 | End: 2024-10-06

## 2024-10-06 RX ORDER — DEXAMETHASONE SODIUM PHOSPHATE 4 MG/ML
INJECTION, SOLUTION INTRA-ARTICULAR; INTRALESIONAL; INTRAMUSCULAR; INTRAVENOUS; SOFT TISSUE
Status: DISCONTINUED | OUTPATIENT
Start: 2024-10-06 | End: 2024-10-06

## 2024-10-06 RX ORDER — ONDANSETRON HYDROCHLORIDE 2 MG/ML
4 INJECTION, SOLUTION INTRAVENOUS EVERY 6 HOURS PRN
Status: DISCONTINUED | OUTPATIENT
Start: 2024-10-06 | End: 2024-10-08 | Stop reason: HOSPADM

## 2024-10-06 RX ORDER — SODIUM CHLORIDE 0.9 % (FLUSH) 0.9 %
10 SYRINGE (ML) INJECTION
Status: DISCONTINUED | OUTPATIENT
Start: 2024-10-06 | End: 2024-10-06 | Stop reason: HOSPADM

## 2024-10-06 RX ORDER — GLUCAGON 1 MG
1 KIT INJECTION
Status: DISCONTINUED | OUTPATIENT
Start: 2024-10-06 | End: 2024-10-06 | Stop reason: HOSPADM

## 2024-10-06 RX ORDER — ONDANSETRON HYDROCHLORIDE 2 MG/ML
4 INJECTION, SOLUTION INTRAVENOUS DAILY PRN
Status: DISCONTINUED | OUTPATIENT
Start: 2024-10-06 | End: 2024-10-06 | Stop reason: HOSPADM

## 2024-10-06 RX ORDER — BUPIVACAINE HYDROCHLORIDE 5 MG/ML
INJECTION, SOLUTION EPIDURAL; INTRACAUDAL
Status: DISCONTINUED | OUTPATIENT
Start: 2024-10-06 | End: 2024-10-06 | Stop reason: HOSPADM

## 2024-10-06 RX ORDER — PROPOFOL 10 MG/ML
INJECTION, EMULSION INTRAVENOUS
Status: DISCONTINUED | OUTPATIENT
Start: 2024-10-06 | End: 2024-10-06

## 2024-10-06 RX ORDER — MIDAZOLAM HYDROCHLORIDE 1 MG/ML
INJECTION INTRAMUSCULAR; INTRAVENOUS
Status: DISCONTINUED | OUTPATIENT
Start: 2024-10-06 | End: 2024-10-06

## 2024-10-06 RX ORDER — LIDOCAINE HYDROCHLORIDE 20 MG/ML
INJECTION, SOLUTION EPIDURAL; INFILTRATION; INTRACAUDAL; PERINEURAL
Status: DISCONTINUED | OUTPATIENT
Start: 2024-10-06 | End: 2024-10-06

## 2024-10-06 RX ORDER — PROCHLORPERAZINE EDISYLATE 5 MG/ML
5 INJECTION INTRAMUSCULAR; INTRAVENOUS EVERY 6 HOURS PRN
Status: DISCONTINUED | OUTPATIENT
Start: 2024-10-06 | End: 2024-10-08 | Stop reason: HOSPADM

## 2024-10-06 RX ORDER — ONDANSETRON HYDROCHLORIDE 2 MG/ML
INJECTION, SOLUTION INTRAVENOUS
Status: DISCONTINUED | OUTPATIENT
Start: 2024-10-06 | End: 2024-10-06

## 2024-10-06 RX ADMIN — PROCHLORPERAZINE EDISYLATE 5 MG: 5 INJECTION INTRAMUSCULAR; INTRAVENOUS at 04:10

## 2024-10-06 RX ADMIN — MORPHINE SULFATE 2 MG: 4 INJECTION, SOLUTION INTRAMUSCULAR; INTRAVENOUS at 04:10

## 2024-10-06 RX ADMIN — MIDAZOLAM HYDROCHLORIDE 1 MG: 1 INJECTION, SOLUTION INTRAMUSCULAR; INTRAVENOUS at 01:10

## 2024-10-06 RX ADMIN — ONDANSETRON 4 MG: 2 INJECTION INTRAMUSCULAR; INTRAVENOUS at 02:10

## 2024-10-06 RX ADMIN — LIDOCAINE HYDROCHLORIDE 120 MG: 20 INJECTION, SOLUTION INTRAVENOUS at 12:10

## 2024-10-06 RX ADMIN — VANCOMYCIN HYDROCHLORIDE 2000 MG: 500 INJECTION, POWDER, LYOPHILIZED, FOR SOLUTION INTRAVENOUS at 01:10

## 2024-10-06 RX ADMIN — ONDANSETRON 4 MG: 2 INJECTION INTRAMUSCULAR; INTRAVENOUS at 04:10

## 2024-10-06 RX ADMIN — PROPOFOL 150 MG: 10 INJECTION, EMULSION INTRAVENOUS at 12:10

## 2024-10-06 RX ADMIN — CEFEPIME 1 G: 1 INJECTION, POWDER, FOR SOLUTION INTRAMUSCULAR; INTRAVENOUS at 04:10

## 2024-10-06 RX ADMIN — DEXAMETHASONE SODIUM PHOSPHATE 8 MG: 4 INJECTION, SOLUTION INTRA-ARTICULAR; INTRALESIONAL; INTRAMUSCULAR; INTRAVENOUS; SOFT TISSUE at 12:10

## 2024-10-06 RX ADMIN — HYDROMORPHONE HYDROCHLORIDE 0.2 MG: 2 INJECTION INTRAMUSCULAR; INTRAVENOUS; SUBCUTANEOUS at 02:10

## 2024-10-06 RX ADMIN — SODIUM CHLORIDE, SODIUM GLUCONATE, SODIUM ACETATE, POTASSIUM CHLORIDE AND MAGNESIUM CHLORIDE: 526; 502; 368; 37; 30 INJECTION, SOLUTION INTRAVENOUS at 12:10

## 2024-10-06 RX ADMIN — FENTANYL CITRATE 50 MCG: 50 INJECTION, SOLUTION INTRAMUSCULAR; INTRAVENOUS at 01:10

## 2024-10-06 RX ADMIN — CEFEPIME 1 G: 1 INJECTION, POWDER, FOR SOLUTION INTRAMUSCULAR; INTRAVENOUS at 10:10

## 2024-10-06 RX ADMIN — PIPERACILLIN AND TAZOBACTAM 4.5 G: 4; .5 INJECTION, POWDER, LYOPHILIZED, FOR SOLUTION INTRAVENOUS; PARENTERAL at 01:10

## 2024-10-06 RX ADMIN — ONDANSETRON 4 MG: 2 INJECTION INTRAMUSCULAR; INTRAVENOUS at 12:10

## 2024-10-06 RX ADMIN — ENOXAPARIN SODIUM 40 MG: 40 INJECTION SUBCUTANEOUS at 04:10

## 2024-10-06 RX ADMIN — PROPOFOL 50 MG: 10 INJECTION, EMULSION INTRAVENOUS at 01:10

## 2024-10-06 RX ADMIN — ONDANSETRON 4 MG: 2 INJECTION INTRAMUSCULAR; INTRAVENOUS at 03:10

## 2024-10-06 RX ADMIN — NICOTINE 1 PATCH: 21 PATCH, EXTENDED RELEASE TRANSDERMAL at 09:10

## 2024-10-06 NOTE — BRIEF OP NOTE
[facility]  Brief Operative Note     SUMMARY     Surgery Date: 10/6/2024     Surgeons and Role:     * Naomi Ray MD - Primary    Assisting ROBERT: Karena Ford PA-C    Pre-op Diagnosis:  Abscess of right breast [N61.1]    Post-op Diagnosis:  Post-Op Diagnosis Codes:     * Abscess of right breast [N61.1]    Procedure(s) (LRB):  MASTOTOMY,WITH ABSCESS DRAINAGE (Right)    Anesthesia: General    Findings/Key Components:  5cm loculated right breast abscess, purulent material evacuated, penrose drain placed    Estimated Blood Loss: 10cc         Specimens:   Cultures aerobic, anerobic, gram stain    Patient tolerated procedure well, no complications    Naomi Ray MD

## 2024-10-06 NOTE — TRANSFER OF CARE
"Anesthesia Transfer of Care Note    Patient: Rene Alston    Procedure(s) Performed: Procedure(s) (LRB):  MASTOTOMY,WITH ABSCESS DRAINAGE (Right)    Patient location: PACU    Anesthesia Type: general    Transport from OR: Transported from OR on room air with adequate spontaneous ventilation    Post pain: adequate analgesia    Post assessment: no apparent anesthetic complications    Post vital signs: stable    Level of consciousness: responds to stimulation and awake    Nausea/Vomiting: no nausea/vomiting    Complications: none    Transfer of care protocol was followed      Last vitals: Visit Vitals  /60 (BP Location: Left arm, Patient Position: Lying)   Pulse (!) 53   Temp 37.3 °C (99.1 °F) (Oral)   Resp 18   Ht 5' 11" (1.803 m)   Wt 127 kg (279 lb 15.8 oz)   SpO2 98%   Breastfeeding No   BMI 39.05 kg/m²     "

## 2024-10-06 NOTE — ANESTHESIA PREPROCEDURE EVALUATION
10/06/2024  Rene Alston is a 34 y.o., female.      Pre-op Assessment    I have reviewed the Patient Summary Reports.     I have reviewed the Nursing Notes. I have reviewed the NPO Status.   I have reviewed the Medications.     Review of Systems  Anesthesia Hx:  No problems with previous Anesthesia                Hematology/Oncology:  Hematology Normal   Oncology Normal                                   EENT/Dental:  EENT/Dental Normal           Cardiovascular:  Cardiovascular Normal                                            Pulmonary:  Pulmonary Normal                       Renal/:  Renal/ Normal                 Hepatic/GI:  Hepatic/GI Normal                 Musculoskeletal:  Musculoskeletal Normal                Neurological:  Neurology Normal                                      Endocrine:  Endocrine Normal          Obesity / BMI > 30  Dermatological:  Skin Normal    Psych:  Psychiatric Normal                  Physical Exam  General: Well nourished, Cooperative, Alert and Oriented    Airway:  Mallampati: II   Mouth Opening: Normal  TM Distance: Normal  Tongue: Normal  Neck ROM: Normal ROM    Dental:  Intact    Chest/Lungs:  Clear to auscultation    Heart:  Rate: Normal    Anesthesia Plan  Type of Anesthesia, risks & benefits discussed:    Anesthesia Type: Gen Supraglottic Airway  Intra-op Monitoring Plan: Standard ASA Monitors  Post Op Pain Control Plan: multimodal analgesia  Induction:  IV  Airway Plan: Direct  Informed Consent: Informed consent signed with the Patient and all parties understand the risks and agree with anesthesia plan.  All questions answered.   ASA Score: 2 Emergent  Day of Surgery Review of History & Physical: H&P Update referred to the surgeon/provider.I have interviewed and examined the patient. I have reviewed the patient's H&P dated:     Ready For Surgery From Anesthesia  Perspective.   .

## 2024-10-06 NOTE — PROGRESS NOTES
[facility] Hospital Medicine Progress Note        Chief Complaint: Inpatient Follow-up    HPI:     34-year-old  female with significant history of recurrent breast cellulitis/abscess in the past . patient has had mammogram which was reportedly benign and has had left breast biopsy which was reportedly benign.  She does follow up with Oncology as outpatient and reports that she is currently being worked up for ?  Leukemia.  Patient presented to the ED with complaints of right breast swelling, pain, erythema.  Was recently treated with clindamycin/Bactrim for left breast cellulitis.  Afebrile and stable hemodynamics in the ED. labs stable.  Admitted to hospital medicine services and was initiated on IV vancomycin, ultrasound soft tissue breast was ordered to rule out abscess.  Also decided to consult breast surgeon given her history of recurrent abscess.  Antibiotics broadened to cefepime, vancomycin, bradycardic, however asymptomatic, ordered TTE.  TTE unremarkable except for diastolic dysfunction.  Evaluated by breast surgery on 10/04, concern for periductal mastitis, will need duct excision for symptomatic resolution, timing to be decided, ultrasound confirmed right breast abscess.  Symptomatically worse on 10/05, breast surgery planning to drain abscess on 10/06, I changed her antibiotics to Zosyn and vanc on 10/05.    Interval Hx:     Patient seen at bedside, right breast swelling, pain, tenderness and redness persist, patient is afebrile and hemodynamics are stable except for bradycardia, she is NPO awaiting incision and drainage in the OR today, complaining of nausea/2 episodes of vomiting since antibiotics were switched to Zosyn     Objective/physical exam:  General: In no acute distress, afebrile  Chest: Clear to auscultation bilaterally, right breast swollen, tender and erythematous-worse today  Heart: S1, S2, no appreciable murmur  Abdomen: Soft, nontender, BS  +  MSK: Warm, no lower extremity edema, no clubbing or cyanosis  Neurologic: Alert and oriented x4, moving all extremities with good strength     VITAL SIGNS: 24 HRS MIN & MAX LAST   Temp  Min: 97.8 °F (36.6 °C)  Max: 98.5 °F (36.9 °C) 98.4 °F (36.9 °C)   BP  Min: 116/75  Max: 139/77 129/74   Pulse  Min: 49  Max: 71  71   Resp  Min: 16  Max: 20 17   SpO2  Min: 98 %  Max: 100 % 98 %       Recent Labs   Lab 10/05/24  0528   WBC 8.49   RBC 3.47*   HGB 11.2*   HCT 34.7*   .0*   MCH 32.3*   MCHC 32.3*   RDW 12.8      MPV 10.4         Recent Labs   Lab 10/05/24  0528      K 4.4      CO2 26   BUN 10.1   CREATININE 0.76   CALCIUM 8.2*   ALBUMIN 2.8*   ALKPHOS 83   ALT 16   AST 15   BILITOT 0.4          Microbiology Results (last 7 days)       Procedure Component Value Units Date/Time    Blood Culture [[id number]]  (Normal) Collected: 10/03/24 2137    Order Status: Completed Specimen: Blood Updated: 10/05/24 2300     Blood Culture No Growth At 48 Hours    Blood Culture [[id number]]  (Normal) Collected: 10/03/24 2137    Order Status: Completed Specimen: Blood Updated: 10/05/24 2300     Blood Culture No Growth At 48 Hours             Scheduled Med:   enoxparin  40 mg Subcutaneous Daily    nicotine  1 patch Transdermal Daily    piperacillin-tazobactam (Zosyn) IV (PEDS and ADULTS) (extended infusion is not appropriate)  4.5 g Intravenous Q8H    vancomycin (VANCOCIN) IV (PEDS and ADULTS)  2,000 mg Intravenous Q12H          Assessment/Plan:    Right breast cellulitis with underlying abscess  Suspected periductal mastitis  History of recurrent bilateral breast cellulitis with abscess requiring intervention  Asymptomatic sinus bradycardia   Chronic tobacco use  Prophylaxis      Ultrasound confirmed abscess  On Zosyn and vancomycin   I will switch her back to cefepime and vancomycin given GI symptoms on Zosyn  Breast surgery planning for incision and drainage of the right breast abscess in OR today  High  suspicion for periductal mastitis   Incision and drainage alone will not be beneficial and the patient will benefit from central duct excision bilaterally  Timing to be decided by breast surgery as far as ductal excision  Blood cultures negative   Afebrile  Echocardiogram ordered 10/3 to further evaluate bradycardia -remarkable for diastolic dysfunction, outpatient follow up with Cardiology  Patient is asymptomatic   Continue nicotine patch   Reports being worked up for leukemia, blood counts normal, continue outpatient follow up with Hematology/Oncology  P.r.n. narcotics for pain associated with cellulitis/abscess  DVT prophylaxis-subQ Lovenox      Leni Woods MD   10/06/2024

## 2024-10-06 NOTE — PROGRESS NOTES
Pharmacokinetic Assessment Follow Up: IV Vancomycin    Vancomycin serum concentration assessment(s):    The trough level was drawn correctly and can be used to guide therapy at this time. The measurement is above the desired definitive target range of 10 to 15 mcg/mL.    Vancomycin Regimen Plan:    Discontinue the scheduled vancomycin regimen and re-dose when the random level is less than 15 mcg/mL, next level to be drawn at 1930 on 10/06/24.    Drug levels (last 3 results):  Recent Labs   Lab Result Units 10/04/24  1155 10/06/24  1142   Vancomycin Trough ug/ml 17.3 38.8*       Pharmacy will continue to follow and monitor vancomycin.    Please contact pharmacy at extension [phone] for questions regarding this assessment.    Thank you for the consult,   Margareth Rodriguez       Patient brief summary:  Rene Alston is a 34 y.o. female initiated on antimicrobial therapy with IV Vancomycin for treatment of skin & soft tissue infection    The patient's current regimen is vancomycin 2000 mg q12h    Drug Allergies:   Review of patient's allergies indicates:   Allergen Reactions    Iodine Rash       Actual Body Weight:   127 kg    Renal Function:   Estimated Creatinine Clearance: 153.6 mL/min (based on SCr of 0.76 mg/dL).,     Dialysis Method (if applicable):  N/A    CBC (last 72 hours):  Recent Labs   Lab Result Units 10/04/24  0353 10/05/24  0528   WBC x10(3)/mcL 8.85 8.49   Hgb g/dL 11.7* 11.2*   Hct % 34.7* 34.7*   Platelet x10(3)/mcL 218 234   Mono % % 7.7 7.3   Eos % % 4.3 4.5   Basophil % % 0.2 0.4       Metabolic Panel (last 72 hours):  Recent Labs   Lab Result Units 10/04/24  0353 10/05/24  0528   Sodium mmol/L 138 138   Potassium mmol/L 3.8 4.4   Chloride mmol/L 109* 107   CO2 mmol/L 23 26   Glucose mg/dL 84 84   Blood Urea Nitrogen mg/dL 10.3 10.1   Creatinine mg/dL 0.81 0.76   Albumin g/dL 2.8* 2.8*   Bilirubin Total mg/dL 0.2 0.4   ALP unit/L 88 83   AST unit/L 18 15   ALT unit/L 17 16       Vancomycin  Administrations:  vancomycin given in the last 96 hours                     vancomycin 2 g in dextrose 5 % 500 mL IVPB (mg) 2,000 mg New Bag 10/06/24 0112     2,000 mg New Bag 10/05/24 1550     2,000 mg New Bag  0132     2,000 mg New Bag 10/04/24 1354     2,000 mg New Bag  0100     2,000 mg New Bag 10/03/24 1246    vancomycin (VANCOCIN) 2,500 mg in D5W 500 mL IVPB (mg) 2,500 mg New Bag 10/03/24 0125                    Microbiologic Results:  Microbiology Results (last 7 days)       Procedure Component Value Units Date/Time    Wound Culture [[id number]] Collected: 10/06/24 1317    Order Status: Sent Specimen: Abscess from Breast, Right Updated: 10/06/24 1401    Anaerobic Culture [[id number]] Collected: 10/06/24 1317    Order Status: Sent Specimen: Abscess from Breast, Right Updated: 10/06/24 1401    Gram Stain [[id number]] Collected: 10/06/24 1317    Order Status: Sent Specimen: Abscess from Breast, Right Updated: 10/06/24 1401    Blood Culture [[id number]]  (Normal) Collected: 10/03/24 2137    Order Status: Completed Specimen: Blood Updated: 10/05/24 2300     Blood Culture No Growth At 48 Hours    Blood Culture [[id number]]  (Normal) Collected: 10/03/24 2137    Order Status: Completed Specimen: Blood Updated: 10/05/24 2300     Blood Culture No Growth At 48 Hours

## 2024-10-06 NOTE — ANESTHESIA PROCEDURE NOTES
Intubation    Date/Time: 10/6/2024 12:59 PM    Performed by: Fausto Braun CRNA  Authorized by: Teodoro Chahal MD    Intubation:     Induction:  Intravenous    Intubated:  N/a    Mask Ventilation:  Not attempted    Attempts:  1    Attempted By:  CRNA    Difficult Airway Encountered?: No      Complications:  None    Airway Device:  Supraglottic airway/LMA    Airway Device Size:  4.0    Style/Cuff Inflation:  Cuffed (inflated to minimal occlusive pressure)    Placement Verified By:  Capnometry    Complicating Factors:  None    Findings Post-Intubation:  BS equal bilateral

## 2024-10-06 NOTE — ANESTHESIA POSTPROCEDURE EVALUATION
Anesthesia Post Evaluation    Patient: Rene Alston    Procedure(s) Performed: Procedure(s) (LRB):  MASTOTOMY,WITH ABSCESS DRAINAGE (Right)    Final Anesthesia Type: general      Patient location during evaluation: PACU  Patient participation: Yes- Able to Participate  Level of consciousness: awake and alert  Post-procedure vital signs: reviewed and stable  Pain management: adequate  Airway patency: patent  LAUREN mitigation strategies: Multimodal analgesia  PONV status at discharge: No PONV  Anesthetic complications: no      Cardiovascular status: blood pressure returned to baseline and hemodynamically stable  Respiratory status: unassisted and spontaneous ventilation  Hydration status: euvolemic  Follow-up not needed.            Vitals Value Taken Time   /74 10/06/24 1442   Temp  10/06/24 1459   Pulse 51 10/06/24 1446   Resp 14 10/06/24 1446   SpO2 100 % 10/06/24 1446   Vitals shown include unfiled device data.      No case tracking events are documented in the log.      Pain/Miguel Angel Score: Pain Rating Prior to Med Admin: 6 (10/6/2024  2:10 PM)  Pain Rating Post Med Admin: 2 (10/5/2024  1:40 PM)  Miguel Angel Score: 8 (10/6/2024  1:46 PM)

## 2024-10-06 NOTE — PROGRESS NOTES
[facility] - 8th Floor Med Surg  Surgical Oncology  Progress Note    Patient Name: Rene Alston  MRN: [id number]  Code Status: Full Code  Admission Date: 10/2/2024  Hospital Length of Stay: 4 days  Attending Physician: Leni Woods MD  Primary Care Provider: Anu, Primary Doctor    Subjective:     Chief Complaint/Reason for Admission: Right breast pain/abscess, chronic left breast abscess/fistula    Interval: 10/06/2024 Patient doing okay this morning. Vital signs stable overnight. Patient still c/o of right breast pain. She did become nauseous and began vomiting overnight. She is feeling better this morning. Still NPO. She has still been using warm wet compresses on the area. Still no drainage from the area. She is still refusing drainage of abscess at bedside, but she is agreeable to OR drainage of breast abscess. No other significant interval changes.     History of Present Illness: Rene Alston is a 34 y.o. female who presented to [facility] ED 10/2 with right breast pain, swelling, and redness x's 3 weeks. 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  Her most recent procedure on the left breast was about 8 months ago at [facility] which seems to have been an I&D at the 10:00 periareolar position.  She states that her doctor has left the practice so she is unable to follow up. She has never had any problems on the right breast until 3 weeks ago when she developed pain and redness. She saw a provider through telehealth who prescribed Bactrim, but she did not have any improvement after 1 week and her antibiotic was changed to clindamycin.  Her right breast continued to worsen despite the oral antibiotics and she presented to the ED. Other than the fistula in the left breast, her left breast is currently asymptomatic.  Her vital signs were stable.  Blood cultures drawn.  She was started on IV  pancho and admitted by hospitalist.  Breast surgery has been consulted for further management.  Patient states that since she was admitted for IV antibiotics, her right breast mass has significantly decreased in size.  She states it is currently the size of a lemon.    Patient is an everyday smoker, usually about 1 pack a cigarettes per day and smokes medical marijuana twice per day.  She denies any chronic medical conditions, but she does report that she is undergoing workup/monitoring for possible smoldering or multiple myeloma with a doctor at [facility].  Her family history includes a mother with an unknown type of cancer who passed away after it  metastasized in her mid 40s.  Other than the breast procedures, her surgical history includes a D&C when she was 18 due to heavy menstrual bleeding. Reports that since she has had regular menstrual cycles with no heavy or prolonged bleeding.      She has had no recent mammograms, and thinks that the last one she had was in  at [place] in [facility].  These reports/images are in her chart and were reviewed.  Bilateral diagnostic mammogram and left breast ultrasound on 2021 showed a focal fluid collection in the left breast which was favored to represent squamous metaplasia of lactiferous ducts versus abscess.  Needle biopsy was performed, which revealed acute and chronic inflammation around the squamous epithelium and keratin, consistent with the subareolar abscess.  Microbiology showed staph lugdunensis.  Patient states that after this needle biopsy was performed she required another I & D which is when her left breast fistula started.    OB/GYN History:  Age at Menarche Onset: 12  Menopausal Status: premenopausal, menstrual cycles regular  Hysterectomy/Oophorectomy: no, neither  Hormonal birth control (duration): yes, only when she was a teenager  Pregnancy History:   Age at first live birth: n/a  Hormone Replacement Therapy: No, none    Other:  MG breast  density: unknown  Prior thoracic RT: none  Genetic testing: none  Ashkenazi Jain descent: No    No current facility-administered medications on file prior to encounter.     Current Outpatient Medications on File Prior to Encounter   Medication Sig    HYDROcodone-acetaminophen (NORCO) 5-325 mg per tablet Take 1 tablet by mouth every 6 (six) hours as needed for Pain.       Review of patient's allergies indicates:   Allergen Reactions    Iodine Rash       No past medical history on file.  No past surgical history on file.  Family History    None       Tobacco Use    Smoking status: Not on file    Smokeless tobacco: Not on file   Substance and Sexual Activity    Alcohol use: Not on file    Drug use: Not on file    Sexual activity: Not on file     Review of Systems   Constitutional:  Negative for chills and fever.   Respiratory:  Negative for shortness of breath.    Cardiovascular:  Negative for chest pain.   Gastrointestinal:  Positive for nausea and vomiting.   Genitourinary:  Negative for dysuria and hematuria.   Musculoskeletal:  Negative for myalgias.   Neurological:  Negative for headaches.   Psychiatric/Behavioral:  Negative for confusion.    All other pertinent history mentioned in HPI.     Objective:     Vital Signs (Most Recent):  Temp: 98.5 °F (36.9 °C) (10/06/24 0820)  Pulse: (!) 54 (10/06/24 0820)  Resp: 20 (10/06/24 0820)  BP: 123/76 (10/06/24 0820)  SpO2: 98 % (10/06/24 0337) Vital Signs (24h Range):  Temp:  [97.8 °F (36.6 °C)-98.5 °F (36.9 °C)] 98.5 °F (36.9 °C)  Pulse:  [49-71] 54  Resp:  [16-20] 20  SpO2:  [98 %-100 %] 98 %  BP: (116-139)/(74-78) 123/76     Weight: 127 kg (279 lb 15.8 oz)  Body mass index is 39.05 kg/m².      Intake/Output Summary (Last 24 hours) at 10/6/2024 0925  Last data filed at 10/6/2024 0519  Gross per 24 hour   Intake 1386.5 ml   Output 2300 ml   Net -913.5 ml       Physical Exam  General: The patient is awake, alert and oriented times three. The patient is well nourished and  in no acute distress.  Neck: There is no evidence of palpable cervical, supraclavicular or axillary adenopathy. The neck is supple. The thyroid is not enlarged.  Musculoskeletal: The patient has a normal range of motion of her bilateral upper extremities.  Chest: Examination of the chest wall fails to reveal any obvious abnormalities. Nonlabored breathing, symmetric expansion.  Breast:  Right: There is an erythematous hard tender subareolar mass consistent with breast abscess. No fluctuance. On US, it measures 28 mm but on exam, the mass is palpably larger (about 6 cm) due to surrounding inflammation. There is a large protuberance noted inferior to nipple. No drainage from the area noted. Otherwise, examination of right breast fails to reveal any dominant masses or areas of significant focal nodularity. The nipple is everted. There is no skin dimpling with movement of the pectoralis. There are no significant skin changes overlying the breast.   Left: There is a well healed scar in the 3:00 axis from prior core needle biopsy to the left breast. In the 10:00 periareolar position, there is another scar with a draining fistula present. Examination of the left breast fails to reveal any dominant masses or areas of significant focal nodularity. The nipple is everted without evidence of discharge. There is no skin dimpling with movement of the pectoralis. There are no significant skin changes overlying the breast.  Abdomen: The abdomen is soft, flat, nontender and nondistended.  Integumentary: no rashes or skin lesions present  Neurologic: cranial nerves intact, no signs of peripheral neurological deficit, motor/sensory function intact    Significant Labs:  CBC:   Recent Labs   Lab 10/05/24  0528   WBC 8.49   RBC 3.47*   HGB 11.2*   HCT 34.7*      .0*   MCH 32.3*   MCHC 32.3*     CMP:   Recent Labs   Lab 10/05/24  0528   CALCIUM 8.2*   ALBUMIN 2.8*      K 4.4   CO2 26      BUN 10.1   CREATININE  0.76   ALKPHOS 83   ALT 16   AST 15   BILITOT 0.4     All pertinent labs from the last 24 hours have been reviewed.    Significant Diagnostics:  U/S: I have reviewed all pertinent results/findings within the past 24 hours.  Ultrasound of the chest directed to the area of pain and swelling on her right breast. Imaging of the inferior right breast identifies a hypoechoic complex fluid collection measuring 24 x 21 x 28 mm.     Assessment/Plan:     Patient Active Problem List    Diagnosis Date Noted    Abscess of right breast 10/04/2024    Chronic abscess of breast 10/04/2024    Breast pain 10/03/2024      Patient's presentation is most consistent with chronic left breast abscesses/fistula and now with periductal mastitis on the right breast which has now developed into a right breast abscess. In cases such as this with recurrence/chronic periductal mastitis with abscesses or fistulas that do not heal despite multiple antibiotics and I&Ds, an operation to completely remove the affected area is required for definitive management.    Discussed right breast I&D at bedside vs in OR today. Patient refuses bedside I&D. Discussed if opts for right side I&D, would need to eventually take patient back for another surgery at a later date to have bilateral central duct excisions for definitive management.    ----------------------------------------------------------------------------------------------------------------------------     Patient's right breast abscess has not significantly improved over the weekend on IV antibiotics, and it has not drained on its own despite conservative measures. Again discussed options for drainage such as bedside I&D vs OR drainage. Patient opted for OR drainage of the abscess. Consents for surgery were reviewed and signed at bedside today. All questions answered. Surgery will be performed this afternoon. Patient is to remain NPO until after surgery.     Advised smoking cessation explaining that  smokers have an increased risk of periductal mastitis and cause recurrences due to chronic damage to breast ducts.    Continue vancomycin.    After discharge, will need follow up BL DG MG and US. This is an outpatient exam and will order this at time of discharge.       Thank you for your consult. We will follow-up with patient. Please contact us if you have any additional questions.    Karena Ford PA-C  Breast Surgical Oncology  [facility] - 8th Floor Med Surg

## 2024-10-07 LAB
ALBUMIN SERPL-MCNC: 2.9 G/DL (ref 3.5–5)
ALBUMIN/GLOB SERPL: 0.8 RATIO (ref 1.1–2)
ALP SERPL-CCNC: 121 UNIT/L (ref 40–150)
ALT SERPL-CCNC: 38 UNIT/L (ref 0–55)
ANION GAP SERPL CALC-SCNC: 7 MEQ/L
ANION GAP SERPL CALC-SCNC: 7 MEQ/L
AST SERPL-CCNC: 34 UNIT/L (ref 5–34)
BASOPHILS # BLD AUTO: 0.03 X10(3)/MCL
BASOPHILS NFR BLD AUTO: 0.2 %
BILIRUB SERPL-MCNC: 0.7 MG/DL
BUN SERPL-MCNC: 19.4 MG/DL (ref 7–18.7)
BUN SERPL-MCNC: 20.7 MG/DL (ref 7–18.7)
CALCIUM SERPL-MCNC: 8.5 MG/DL (ref 8.4–10.2)
CALCIUM SERPL-MCNC: 8.6 MG/DL (ref 8.4–10.2)
CHLORIDE SERPL-SCNC: 102 MMOL/L (ref 98–107)
CHLORIDE SERPL-SCNC: 102 MMOL/L (ref 98–107)
CO2 SERPL-SCNC: 25 MMOL/L (ref 22–29)
CO2 SERPL-SCNC: 25 MMOL/L (ref 22–29)
CREAT SERPL-MCNC: 3.38 MG/DL (ref 0.55–1.02)
CREAT SERPL-MCNC: 3.69 MG/DL (ref 0.55–1.02)
CREAT/UREA NIT SERPL: 6
CREAT/UREA NIT SERPL: 6
EOSINOPHIL # BLD AUTO: 0 X10(3)/MCL (ref 0–0.9)
EOSINOPHIL NFR BLD AUTO: 0 %
ERYTHROCYTE [DISTWIDTH] IN BLOOD BY AUTOMATED COUNT: 12.4 % (ref 11.5–17)
GFR SERPLBLD CREATININE-BSD FMLA CKD-EPI: 16 ML/MIN/1.73/M2
GFR SERPLBLD CREATININE-BSD FMLA CKD-EPI: 18 ML/MIN/1.73/M2
GLOBULIN SER-MCNC: 3.6 GM/DL (ref 2.4–3.5)
GLUCOSE SERPL-MCNC: 109 MG/DL (ref 74–100)
GLUCOSE SERPL-MCNC: 111 MG/DL (ref 74–100)
HCT VFR BLD AUTO: 34.7 % (ref 37–47)
HGB BLD-MCNC: 11.3 G/DL (ref 12–16)
IMM GRANULOCYTES # BLD AUTO: 0.14 X10(3)/MCL (ref 0–0.04)
IMM GRANULOCYTES NFR BLD AUTO: 0.8 %
LYMPHOCYTES # BLD AUTO: 0.74 X10(3)/MCL (ref 0.6–4.6)
LYMPHOCYTES NFR BLD AUTO: 4.2 %
MCH RBC QN AUTO: 32.8 PG (ref 27–31)
MCHC RBC AUTO-ENTMCNC: 32.6 G/DL (ref 33–36)
MCV RBC AUTO: 100.9 FL (ref 80–94)
MONOCYTES # BLD AUTO: 1.02 X10(3)/MCL (ref 0.1–1.3)
MONOCYTES NFR BLD AUTO: 5.8 %
NEUTROPHILS # BLD AUTO: 15.71 X10(3)/MCL (ref 2.1–9.2)
NEUTROPHILS NFR BLD AUTO: 89 %
NRBC BLD AUTO-RTO: 0 %
PLATELET # BLD AUTO: 192 X10(3)/MCL (ref 130–400)
PMV BLD AUTO: 10.9 FL (ref 7.4–10.4)
POTASSIUM SERPL-SCNC: 4.3 MMOL/L (ref 3.5–5.1)
POTASSIUM SERPL-SCNC: 4.8 MMOL/L (ref 3.5–5.1)
PROT SERPL-MCNC: 6.5 GM/DL (ref 6.4–8.3)
RBC # BLD AUTO: 3.44 X10(6)/MCL (ref 4.2–5.4)
SODIUM SERPL-SCNC: 134 MMOL/L (ref 136–145)
SODIUM SERPL-SCNC: 134 MMOL/L (ref 136–145)
VANCOMYCIN TROUGH SERPL-MCNC: 28.1 UG/ML (ref 15–20)
WBC # BLD AUTO: 17.64 X10(3)/MCL (ref 4.5–11.5)

## 2024-10-07 PROCEDURE — 25000003 PHARM REV CODE 250: Performed by: INTERNAL MEDICINE

## 2024-10-07 PROCEDURE — 63600175 PHARM REV CODE 636 W HCPCS: Performed by: NURSE PRACTITIONER

## 2024-10-07 PROCEDURE — 36415 COLL VENOUS BLD VENIPUNCTURE: CPT | Performed by: INTERNAL MEDICINE

## 2024-10-07 PROCEDURE — 63600175 PHARM REV CODE 636 W HCPCS: Performed by: INTERNAL MEDICINE

## 2024-10-07 PROCEDURE — 80202 ASSAY OF VANCOMYCIN: CPT | Performed by: INTERNAL MEDICINE

## 2024-10-07 PROCEDURE — 85025 COMPLETE CBC W/AUTO DIFF WBC: CPT | Performed by: INTERNAL MEDICINE

## 2024-10-07 PROCEDURE — 80053 COMPREHEN METABOLIC PANEL: CPT | Performed by: INTERNAL MEDICINE

## 2024-10-07 PROCEDURE — 11000001 HC ACUTE MED/SURG PRIVATE ROOM

## 2024-10-07 PROCEDURE — 19020 MASTOTOMY EXPL DRG ABSC DP: CPT | Mod: RT,,, | Performed by: SURGERY

## 2024-10-07 RX ORDER — LINEZOLID 600 MG/1
600 TABLET, FILM COATED ORAL EVERY 12 HOURS
Status: DISCONTINUED | OUTPATIENT
Start: 2024-10-07 | End: 2024-10-08 | Stop reason: HOSPADM

## 2024-10-07 RX ORDER — POLYETHYLENE GLYCOL 3350 17 G/17G
17 POWDER, FOR SOLUTION ORAL DAILY
Status: DISCONTINUED | OUTPATIENT
Start: 2024-10-07 | End: 2024-10-08 | Stop reason: HOSPADM

## 2024-10-07 RX ORDER — SODIUM CHLORIDE 9 MG/ML
INJECTION, SOLUTION INTRAVENOUS CONTINUOUS
Status: ACTIVE | OUTPATIENT
Start: 2024-10-07 | End: 2024-10-08

## 2024-10-07 RX ADMIN — ONDANSETRON 4 MG: 2 INJECTION INTRAMUSCULAR; INTRAVENOUS at 09:10

## 2024-10-07 RX ADMIN — CEFEPIME 1 G: 1 INJECTION, POWDER, FOR SOLUTION INTRAMUSCULAR; INTRAVENOUS at 05:10

## 2024-10-07 RX ADMIN — HYDROCODONE BITARTRATE AND ACETAMINOPHEN 1 TABLET: 10; 325 TABLET ORAL at 12:10

## 2024-10-07 RX ADMIN — SODIUM CHLORIDE: 9 INJECTION, SOLUTION INTRAVENOUS at 11:10

## 2024-10-07 RX ADMIN — LINEZOLID 600 MG: 600 TABLET, FILM COATED ORAL at 10:10

## 2024-10-07 RX ADMIN — ENOXAPARIN SODIUM 40 MG: 40 INJECTION SUBCUTANEOUS at 06:10

## 2024-10-07 RX ADMIN — CEFEPIME 1 G: 1 INJECTION, POWDER, FOR SOLUTION INTRAMUSCULAR; INTRAVENOUS at 06:10

## 2024-10-07 RX ADMIN — POLYETHYLENE GLYCOL 3350 17 G: 17 POWDER, FOR SOLUTION ORAL at 06:10

## 2024-10-07 NOTE — PROGRESS NOTES
Pharmacokinetic Assessment Follow Up: IV Vancomycin    Vancomycin serum concentration assessment(s):    The random level was drawn correctly and can be used to guide therapy at this time. The measurement is above the desired definitive target range of 10 to 15 mcg/mL.    Vancomycin Regimen Plan:    Re-dose when the random level is less than 15 mcg/mL, next level to be drawn at 1930 on 10/07/24    Drug levels (last 3 results):  Recent Labs   Lab Result Units 10/04/24  1155 10/06/24  1142 10/06/24  1945   Vancomycin Random ug/ml  --   --  35.3*   Vancomycin Trough ug/ml 17.3 38.8*  --        Pharmacy will continue to follow and monitor vancomycin.    Please contact pharmacy at extension [phone] for questions regarding this assessment.    Thank you for the consult,   Margareth Rodriguez       Patient brief summary:  Rene Alston is a 34 y.o. female initiated on antimicrobial therapy with IV Vancomycin for treatment of skin & soft tissue infection    The patient's current regimen is on hold    Drug Allergies:   Review of patient's allergies indicates:   Allergen Reactions    Iodine Rash       Actual Body Weight:   127 kg    Renal Function:   Estimated Creatinine Clearance: 153.6 mL/min (based on SCr of 0.76 mg/dL).,     Dialysis Method (if applicable):  N/A    CBC (last 72 hours):  Recent Labs   Lab Result Units 10/04/24  0353 10/05/24  0528   WBC x10(3)/mcL 8.85 8.49   Hgb g/dL 11.7* 11.2*   Hct % 34.7* 34.7*   Platelet x10(3)/mcL 218 234   Mono % % 7.7 7.3   Eos % % 4.3 4.5   Basophil % % 0.2 0.4       Metabolic Panel (last 72 hours):  Recent Labs   Lab Result Units 10/04/24  0353 10/05/24  0528   Sodium mmol/L 138 138   Potassium mmol/L 3.8 4.4   Chloride mmol/L 109* 107   CO2 mmol/L 23 26   Glucose mg/dL 84 84   Blood Urea Nitrogen mg/dL 10.3 10.1   Creatinine mg/dL 0.81 0.76   Albumin g/dL 2.8* 2.8*   Bilirubin Total mg/dL 0.2 0.4   ALP unit/L 88 83   AST unit/L 18 15   ALT unit/L 17 16       Vancomycin  Administrations:  vancomycin given in the last 96 hours                     vancomycin 2 g in dextrose 5 % 500 mL IVPB (mg) 2,000 mg New Bag 10/06/24 0112     2,000 mg New Bag 10/05/24 1550     2,000 mg New Bag  0132     2,000 mg New Bag 10/04/24 1354     2,000 mg New Bag  0100     2,000 mg New Bag 10/03/24 1246    vancomycin (VANCOCIN) 2,500 mg in D5W 500 mL IVPB (mg) 2,500 mg New Bag 10/03/24 0125                    Microbiologic Results:  Microbiology Results (last 7 days)       Procedure Component Value Units Date/Time    Gram Stain [[id number]] Collected: 10/06/24 1317    Order Status: Completed Specimen: Abscess from Breast, Right Updated: 10/06/24 1559     GRAM STAIN Few WBC observed      No bacteria seen    Wound Culture [[id number]] Collected: 10/06/24 1317    Order Status: Resulted Specimen: Abscess from Breast, Right Updated: 10/06/24 1401    Anaerobic Culture [[id number]] Collected: 10/06/24 1317    Order Status: Sent Specimen: Abscess from Breast, Right Updated: 10/06/24 1401    Blood Culture [[id number]]  (Normal) Collected: 10/03/24 2137    Order Status: Completed Specimen: Blood Updated: 10/05/24 2300     Blood Culture No Growth At 48 Hours    Blood Culture [[id number]]  (Normal) Collected: 10/03/24 2137    Order Status: Completed Specimen: Blood Updated: 10/05/24 2300     Blood Culture No Growth At 48 Hours

## 2024-10-07 NOTE — PLAN OF CARE
Problem: Adult Inpatient Plan of Care  Goal: Plan of Care Review  Outcome: Progressing  Goal: Patient-Specific Goal (Individualized)  Outcome: Progressing  Goal: Absence of Hospital-Acquired Illness or Injury  Outcome: Progressing  Goal: Optimal Comfort and Wellbeing  Outcome: Progressing  Goal: Readiness for Transition of Care  Outcome: Progressing     Problem: Fall Injury Risk  Goal: Absence of Fall and Fall-Related Injury  Outcome: Progressing     Problem: Wound  Goal: Optimal Coping  Outcome: Progressing  Goal: Optimal Functional Ability  Outcome: Progressing  Goal: Absence of Infection Signs and Symptoms  Outcome: Progressing  Goal: Improved Oral Intake  Outcome: Progressing  Goal: Optimal Pain Control and Function  Outcome: Progressing  Goal: Skin Health and Integrity  Outcome: Progressing  Goal: Optimal Wound Healing  Outcome: Progressing     Problem: Pain Acute  Goal: Optimal Pain Control and Function  Outcome: Progressing

## 2024-10-07 NOTE — OP NOTE
OPERATIVE PROCEDURE NOTE    DATE OF PROCEDURE: 10/2/2024    SURGEON: Naomi Ray M.D.    ASSISTANT:  Karena Ford PA-C    PREOPERATIVE DIAGNOSIS: Abscess of right breast [N61.1]     POSTOPERATIVE DIAGNOSIS: Post-Op Diagnosis Codes:     * Abscess of right breast [N61.1]     ANESTHESIA: General Anesthesiologist: Teodoro Chahal MD  CRNA: Fausto Braun CRNA     PROCEDURES PERFORMED:   MASTOTOMY,WITH ABSCESS DRAINAGE: 05029 (CPT®)  INCISION AND DRAINAGE, ABSCESS:        PROCEDURE IN DETAIL:   After informed consent was discussed and obtained, the patient was transported to the operative suite and general anesthesia was provided. After the patient was prepped and draped in the normal sterile fashion, we performed a comprehensive time out confirming patient, site and procedure. A 4cm incision was made in the periareolar skin and deepened sharply until penny purulence was encountered. This was cultured for future antibiotic targeting. The area was throughly investigated, loculation were broken up and the abscess cavity was irrigated copiously. Hemostasis was achieved with electrocautery and the skin was superficially approximated with 2 2-0 prolene sutures around a penrose drain which was secured in place using 2-0 prolene. All sponge and needle counts were correct at the completion of the procedure. Ms Alston was awakened from general anesthesia, tolerated the procedure and anesthesia well, and was transported to the PACU with no complications.       Significant Surgical Tasks Conducted by the Assistant(s), if Applicable: The skilled assistance of the Physician Assistant, Karena Ford PA-C, was necessary for the successful completion of this case. She was essential for proper positioning of the patient, manipulation of instruments, proper exposure, manipulation of tissue, and wound closure.       ESTIMATED BLOOD LOSS: 20ml        Specimens:   Culture swabs sent for aerobic, anaerobic and gram stain               Condition: Good    Disposition: PACU - hemodynamically stable.    Attestation: I was present and scrubbed for the entire procedure.    Naomi Ray MD

## 2024-10-07 NOTE — PROGRESS NOTES
[facility] - 8th Floor Med Surg  Progress Note    Patient Name: Rene Alston  MRN: [id number]  Patient Class: IP- Inpatient   Admission Date: 10/2/2024  Length of Stay: 5 days  Attending Physician: Leni Woods MD  Primary Care Provider: Anu, Primary Doctor    Subjective:   Ms. Alston is 1 day s/p incision and drainage of large right breast abscess. Clinically doing well       Physical Exam:  Right breast appears significantly less edematous, penrose drain in place. Some serosanguinous drainage as expected    Assessment/Plan:      Stable for discharge home from breast surgery perspective, did note significant VIVIAN so may remain under hospitalist care for this indication.   Recommend oral antibiotics for remaining mastitis, coverage for anaerobes- can be refined once cultures return but would not remain in hospital for speciation  She should follow up in breast clinic within a week of discharge from hospital  Discussed wound care, keeping area dry and covered, OK to shower and bathe, pat dry and reapply dressing    Active Diagnoses:    Diagnosis Date Noted POA    PRINCIPAL PROBLEM:  Breast pain [N64.4] 10/03/2024 Yes    Abscess of right breast [N61.1] 10/04/2024 Unknown    Chronic abscess of breast [N61.1] 10/04/2024 Unknown      Problems Resolved During this Admission:     VTE Risk Mitigation (From admission, onward)           Ordered     enoxaparin injection 40 mg  Daily         10/03/24 0032     IP VTE HIGH RISK PATIENT  Once         10/03/24 0032     Place sequential compression device  Until discontinued         10/03/24 0032                   Please call with any question or concerns.     Naomi Ray MD  [facility] Breast Surgery  [facility]

## 2024-10-07 NOTE — PROGRESS NOTES
[facility] Hospital Medicine Progress Note        Chief Complaint: Inpatient Follow-up    HPI:     34-year-old  female with significant history of recurrent breast cellulitis/abscess in the past . patient has had mammogram which was reportedly benign and has had left breast biopsy which was reportedly benign.  She does follow up with Oncology as outpatient and reports that she is currently being worked up for ?  Leukemia.  Patient presented to the ED with complaints of right breast swelling, pain, erythema.  Was recently treated with clindamycin/Bactrim for left breast cellulitis.  Afebrile and stable hemodynamics in the ED. labs stable.  Admitted to hospital medicine services and was initiated on IV vancomycin, ultrasound soft tissue breast was ordered to rule out abscess.  Also decided to consult breast surgeon given her history of recurrent abscess.  Antibiotics broadened to cefepime, vancomycin, bradycardic, however asymptomatic, ordered TTE.  TTE unremarkable except for diastolic dysfunction.  Evaluated by breast surgery on 10/04, concern for periductal mastitis, will need duct excision for symptomatic resolution, timing to be decided, ultrasound confirmed right breast abscess.  Symptomatically worse on 10/05, breast surgery planning to drain abscess on 10/06, I changed her antibiotics to Zosyn and vanc on 10/05.  Worsening vomiting on Zosyn and therefore Zosyn switched back to cefepime, incision and drainage scheduled in OR on 10/06    Interval Hx:     Patient seen at bedside, comfortably laying in bed, afebrile, hemodynamics stable except for intermittent bradycardia.  No new complaints, patient is upset that she can not go home       Objective/physical exam:  General: In no acute distress, afebrile  Chest: Clear to auscultation   Heart: S1, S2, no appreciable murmur  Abdomen: Soft, nontender, BS +  MSK: Warm, no lower extremity edema, no clubbing or  cyanosis  Neurologic: Alert and oriented x4, moving all extremities with good strength     VITAL SIGNS: 24 HRS MIN & MAX LAST   Temp  Min: 97.4 °F (36.3 °C)  Max: 99.1 °F (37.3 °C) 98.1 °F (36.7 °C)   BP  Min: 104/63  Max: 152/78 126/78   Pulse  Min: 46  Max: 76  (!) 54   Resp  Min: 14  Max: 20 20   SpO2  Min: 96 %  Max: 100 % 99 %       Recent Labs   Lab 10/07/24  0348   WBC 17.64*   RBC 3.44*   HGB 11.3*   HCT 34.7*   .9*   MCH 32.8*   MCHC 32.6*   RDW 12.4      MPV 10.9*         Recent Labs   Lab 10/07/24  0348   *   K 4.8      CO2 25   BUN 19.4*   CREATININE 3.38*   CALCIUM 8.5   ALBUMIN 2.9*   ALKPHOS 121   ALT 38   AST 34   BILITOT 0.7          Microbiology Results (last 7 days)       Procedure Component Value Units Date/Time    Wound Culture [[id number]] Collected: 10/06/24 1317    Order Status: Completed Specimen: Abscess from Breast, Right Updated: 10/07/24 0656     Wound Culture No Growth At 24 Hours    Blood Culture [[id number]]  (Normal) Collected: 10/03/24 2137    Order Status: Completed Specimen: Blood Updated: 10/06/24 2300     Blood Culture No Growth At 72 Hours    Blood Culture [[id number]]  (Normal) Collected: 10/03/24 2137    Order Status: Completed Specimen: Blood Updated: 10/06/24 2300     Blood Culture No Growth At 72 Hours    Gram Stain [[id number]] Collected: 10/06/24 1317    Order Status: Completed Specimen: Abscess from Breast, Right Updated: 10/06/24 1559     GRAM STAIN Few WBC observed      No bacteria seen    Anaerobic Culture [[id number]] Collected: 10/06/24 1317    Order Status: Sent Specimen: Abscess from Breast, Right Updated: 10/06/24 1401             Scheduled Med:   ceFEPime IV (PEDS and ADULTS)  1 g Intravenous Q8H    enoxparin  40 mg Subcutaneous Daily    nicotine  1 patch Transdermal Daily          Assessment/Plan:    Right breast cellulitis with underlying abscess status post mastotomy with abscess drainage on 10/06  Suspected periductal  mastitis  Acute kidney injury-suspect vanc toxicity  History of recurrent bilateral breast cellulitis with abscess requiring intervention  Asymptomatic sinus bradycardia   Chronic tobacco use  Prophylaxis      Patient underwent incision and drainage with mastotomy in OR on 10/06   Blood cultures negative   Follow-up intraoperative cultures, so far negative   She is on cefepime, vancomycin   Significant Zuhair, suspect vanc toxicity, DC vanc and switch to Zyvox  IV fluids-normal saline at 100 cc/hour   Ultrasound retroperitoneum  High suspicion for periductal mastitis   Incision and drainage alone will not be beneficial and the patient will benefit from central duct excision bilaterally  Timing to be decided by breast surgery as far as ductal excision  Echocardiogram ordered 10/3 to further evaluate bradycardia -remarkable for diastolic dysfunction, outpatient follow up with Cardiology  Patient is asymptomatic   Continue nicotine patch   Reports being worked up for leukemia, blood counts normal, continue outpatient follow up with Hematology/Oncology  P.r.n. narcotics for pain   DVT prophylaxis-subQ Lovenox    Await intraoperative cultures   Plan to DC home once cleared by breast surgeon and also once Zuhair is resolved   Ductal excision timing to be decided        Leni Woods MD   10/07/2024

## 2024-10-08 VITALS
DIASTOLIC BLOOD PRESSURE: 86 MMHG | WEIGHT: 280 LBS | SYSTOLIC BLOOD PRESSURE: 134 MMHG | OXYGEN SATURATION: 92 % | RESPIRATION RATE: 18 BRPM | TEMPERATURE: 98 F | HEART RATE: 84 BPM | HEIGHT: 71 IN | BODY MASS INDEX: 39.2 KG/M2

## 2024-10-08 LAB
ALBUMIN SERPL-MCNC: 3 G/DL (ref 3.5–5)
ALBUMIN/GLOB SERPL: 0.8 RATIO (ref 1.1–2)
ALP SERPL-CCNC: 107 UNIT/L (ref 40–150)
ALT SERPL-CCNC: 28 UNIT/L (ref 0–55)
ANION GAP SERPL CALC-SCNC: 10 MEQ/L
AST SERPL-CCNC: 21 UNIT/L (ref 5–34)
BACTERIA BLD CULT: NORMAL
BACTERIA BLD CULT: NORMAL
BASOPHILS # BLD AUTO: 0.04 X10(3)/MCL
BASOPHILS NFR BLD AUTO: 0.3 %
BILIRUB SERPL-MCNC: 0.7 MG/DL
BUN SERPL-MCNC: 25 MG/DL (ref 7–18.7)
CALCIUM SERPL-MCNC: 8.6 MG/DL (ref 8.4–10.2)
CHLORIDE SERPL-SCNC: 107 MMOL/L (ref 98–107)
CO2 SERPL-SCNC: 23 MMOL/L (ref 22–29)
CREAT SERPL-MCNC: 4.27 MG/DL (ref 0.55–1.02)
CREAT/UREA NIT SERPL: 6
EOSINOPHIL # BLD AUTO: 0.03 X10(3)/MCL (ref 0–0.9)
EOSINOPHIL NFR BLD AUTO: 0.2 %
ERYTHROCYTE [DISTWIDTH] IN BLOOD BY AUTOMATED COUNT: 12.7 % (ref 11.5–17)
GFR SERPLBLD CREATININE-BSD FMLA CKD-EPI: 13 ML/MIN/1.73/M2
GLOBULIN SER-MCNC: 3.6 GM/DL (ref 2.4–3.5)
GLUCOSE SERPL-MCNC: 84 MG/DL (ref 74–100)
HCT VFR BLD AUTO: 31.6 % (ref 37–47)
HGB BLD-MCNC: 10.3 G/DL (ref 12–16)
IMM GRANULOCYTES # BLD AUTO: 0.04 X10(3)/MCL (ref 0–0.04)
IMM GRANULOCYTES NFR BLD AUTO: 0.3 %
LYMPHOCYTES # BLD AUTO: 1.41 X10(3)/MCL (ref 0.6–4.6)
LYMPHOCYTES NFR BLD AUTO: 10.9 %
MCH RBC QN AUTO: 32.7 PG (ref 27–31)
MCHC RBC AUTO-ENTMCNC: 32.6 G/DL (ref 33–36)
MCV RBC AUTO: 100.3 FL (ref 80–94)
MONOCYTES # BLD AUTO: 1.14 X10(3)/MCL (ref 0.1–1.3)
MONOCYTES NFR BLD AUTO: 8.8 %
NEUTROPHILS # BLD AUTO: 10.3 X10(3)/MCL (ref 2.1–9.2)
NEUTROPHILS NFR BLD AUTO: 79.5 %
NRBC BLD AUTO-RTO: 0 %
PLATELET # BLD AUTO: 212 X10(3)/MCL (ref 130–400)
PMV BLD AUTO: 11 FL (ref 7.4–10.4)
POTASSIUM SERPL-SCNC: 4.7 MMOL/L (ref 3.5–5.1)
PROT SERPL-MCNC: 6.6 GM/DL (ref 6.4–8.3)
RBC # BLD AUTO: 3.15 X10(6)/MCL (ref 4.2–5.4)
SODIUM SERPL-SCNC: 140 MMOL/L (ref 136–145)
WBC # BLD AUTO: 12.96 X10(3)/MCL (ref 4.5–11.5)

## 2024-10-08 PROCEDURE — 80053 COMPREHEN METABOLIC PANEL: CPT | Performed by: INTERNAL MEDICINE

## 2024-10-08 PROCEDURE — 85025 COMPLETE CBC W/AUTO DIFF WBC: CPT | Performed by: INTERNAL MEDICINE

## 2024-10-08 PROCEDURE — 63600175 PHARM REV CODE 636 W HCPCS: Performed by: INTERNAL MEDICINE

## 2024-10-08 PROCEDURE — 25000003 PHARM REV CODE 250: Performed by: INTERNAL MEDICINE

## 2024-10-08 PROCEDURE — 36415 COLL VENOUS BLD VENIPUNCTURE: CPT | Performed by: INTERNAL MEDICINE

## 2024-10-08 RX ORDER — SODIUM CHLORIDE 9 MG/ML
INJECTION, SOLUTION INTRAVENOUS CONTINUOUS
Status: DISCONTINUED | OUTPATIENT
Start: 2024-10-08 | End: 2024-10-08 | Stop reason: HOSPADM

## 2024-10-08 RX ORDER — ENOXAPARIN SODIUM 100 MG/ML
30 INJECTION SUBCUTANEOUS EVERY 24 HOURS
Status: DISCONTINUED | OUTPATIENT
Start: 2024-10-08 | End: 2024-10-08 | Stop reason: HOSPADM

## 2024-10-08 RX ADMIN — CEFEPIME 1 G: 1 INJECTION, POWDER, FOR SOLUTION INTRAMUSCULAR; INTRAVENOUS at 05:10

## 2024-10-08 RX ADMIN — HYDROCODONE BITARTRATE AND ACETAMINOPHEN 1 TABLET: 10; 325 TABLET ORAL at 05:10

## 2024-10-08 NOTE — NURSING
"Pt requesting an update on lab work and to speak with Dr. Woods about her plan of care. Dr. Woods notified and arrived to unit. Pt found sitting on toilet and aggressively threw open bathroom door to speak with staff. Upon reviewing POC with MD, pt was argumentative and unreceptive to staff support. Pt became verbally aggressive towards physician. This RN attempted to set boundaries regarding pt behavior asking her to not yell. Pt stood up from toilet and charged at this RN and Dr. Woods stating "get the fuck out of here. Both of yall" Staff left room and shut pt door at this time. Upon shutting the door, this RN heard loud crashing noises from inside the room. Security called for assistance. Management and security spoke with pt in room. Pt requesting to leave AMA. Pt provided with AMA paperwork and signed with manager Megha. Pt left AMA at 1230 with security present.   "

## 2024-10-08 NOTE — NURSING
Manager notified of patient being aggressive, yelling, and throwing things in the room. Manager arrived at the room and overheard the patient yelling behind the door. Manager knocked loudly and announced self and observed the patient yelling and throwing items in the room and advising that she is leaving. Manager observed the IV pole, IV Pump, and IV modules on the floor with the IV tubing ripped from the fluid bag. Fluid bag was thrown across the room. Manager attempted to de-escalate the situation but was unable to calm the patient. Security arrived in the room. Manager continued to speak with patient about concerns and advised she not leave against medical advise and advised of the risks of leaving the hospital. Manager unable to convince the patient to stay. Patient signed AMA forms and was escorted out of the building by security. IV Pump and IV Modules damages upon inspection by manager.

## 2024-10-08 NOTE — PROGRESS NOTES
Patient is refusing all care at this time. She's refusing  IV placement and medications.She will call if she needs anything or changes her mind

## 2024-10-08 NOTE — PROGRESS NOTES
[facility]  Hospital Medicine Progress Note        Chief Complaint: Inpatient Follow-up    HPI:     34-year-old  female with significant history of recurrent breast cellulitis/abscess in the past . patient has had mammogram which was reportedly benign and has had left breast biopsy which was reportedly benign.  She does follow up with Oncology as outpatient and reports that she is currently being worked up for ?  Leukemia.  Patient presented to the ED with complaints of right breast swelling, pain, erythema.  Was recently treated with clindamycin/Bactrim for left breast cellulitis.  Afebrile and stable hemodynamics in the ED. labs stable.  Admitted to hospital medicine services and was initiated on IV vancomycin, ultrasound soft tissue breast was ordered to rule out abscess.  Also decided to consult breast surgeon given her history of recurrent abscess.  Antibiotics broadened to cefepime, vancomycin, bradycardic, however asymptomatic, ordered TTE.  TTE unremarkable except for diastolic dysfunction.  Evaluated by breast surgery on 10/04, concern for periductal mastitis, will need duct excision for symptomatic resolution, timing to be decided, ultrasound confirmed right breast abscess.  Symptomatically worse on 10/05, breast surgery planning to drain abscess on 10/06, I changed her antibiotics to Zosyn and vanc on 10/05.  Worsening vomiting on Zosyn and therefore Zosyn switched back to cefepime, incision and drainage scheduled in OR on 10/06.  Patient developed acute kidney injury likely secondary to vancomycin and therefore vancomycin discontinued 10/7 and switched to Zyvox, initiated IV fluids, ultrasound retroperitoneum with no obstructive uropathy    Interval Hx:     Patient seen at bedside in the morning, per nursing staff she was refusing all care including IV fluids and medications, I tried to convince her.  She was complaining of nausea/vomiting and diarrhea which  he attributed to antibiotics. Labs were pending at the time, patient wanted to wait for the labs and I told her I will come back when I have labs available.     Objective/physical exam:  General: In no acute distress, afebrile  Chest: Clear to auscultation   Heart: S1, S2, no appreciable murmur  Abdomen: Soft, nontender, BS +  MSK: Warm, no lower extremity edema, no clubbing or cyanosis  Neurologic: Alert and oriented x4, moving all extremities with good strength     VITAL SIGNS: 24 HRS MIN & MAX LAST   Temp  Min: 97.7 °F (36.5 °C)  Max: 98.9 °F (37.2 °C) 98.5 °F (36.9 °C)   BP  Min: 108/57  Max: 132/79 113/65   Pulse  Min: 49  Max: 78  (!) 50 (patient asleep)   Resp  Min: 17  Max: 20 18   SpO2  Min: 97 %  Max: 100 % 100 %       Recent Labs   Lab 10/07/24  0348   WBC 17.64*   RBC 3.44*   HGB 11.3*   HCT 34.7*   .9*   MCH 32.8*   MCHC 32.6*   RDW 12.4      MPV 10.9*         Recent Labs   Lab 10/07/24  0348 10/07/24  0857   * 134*   K 4.8 4.3    102   CO2 25 25   BUN 19.4* 20.7*   CREATININE 3.38* 3.69*   CALCIUM 8.5 8.6   ALBUMIN 2.9*  --    ALKPHOS 121  --    ALT 38  --    AST 34  --    BILITOT 0.7  --           Microbiology Results (last 7 days)       Procedure Component Value Units Date/Time    Blood Culture [[id number]]  (Normal) Collected: 10/03/24 2137    Order Status: Completed Specimen: Blood Updated: 10/07/24 2300     Blood Culture No Growth At 96 Hours    Blood Culture [[id number]]  (Normal) Collected: 10/03/24 2137    Order Status: Completed Specimen: Blood Updated: 10/07/24 2300     Blood Culture No Growth At 96 Hours    Wound Culture [[id number]] Collected: 10/06/24 1317    Order Status: Completed Specimen: Abscess from Breast, Right Updated: 10/07/24 0656     Wound Culture No Growth At 24 Hours    Gram Stain [[id number]] Collected: 10/06/24 1317    Order Status: Completed Specimen: Abscess from Breast, Right Updated: 10/06/24 4789     GRAM STAIN Few WBC observed      No  bacteria seen    Anaerobic Culture [[id number]] Collected: 10/06/24 1317    Order Status: Sent Specimen: Abscess from Breast, Right Updated: 10/06/24 1401             Scheduled Med:   ceFEPime IV (PEDS and ADULTS)  1 g Intravenous Q12H    enoxparin  40 mg Subcutaneous Daily    linezolid  600 mg Oral Q12H    nicotine  1 patch Transdermal Daily    polyethylene glycol  17 g Oral Daily          Assessment/Plan:    Right breast cellulitis with underlying abscess status post mastotomy with abscess drainage on 10/06  Suspected periductal mastitis  Non intractable nausea/vomiting/diarrhea  Acute kidney injury-suspect vanc toxicity, worse  History of recurrent bilateral breast cellulitis with abscess requiring intervention  Asymptomatic sinus bradycardia   Chronic tobacco use  Prophylaxis      Patient underwent incision and drainage with mastotomy in OR on 10/06   Blood cultures negative   Follow-up intraoperative cultures, so far negative   On cefepime, Zyvox   Vancomycin held since 10/07 given high trough and also Michael secondary to vanc  Added IV fluids 10/7-normal saline at 100 cc/hour , patient refusing IV fluids, very hard to convince.  CT abdomen/pelvis without contrast ordered to further evaluate her GI symptoms  Her abdominal examination is completely benign  Nephrology consult for worsening Ak I  No obstructive uropathy  High suspicion for periductal mastitis   Incision and drainage alone will not be beneficial and the patient will benefit from central duct excision bilaterally  Now that she has underwent incision and drainage, duct excision will be delayed and will be pursued as outpatient  Echocardiogram ordered 10/3 to further evaluate bradycardia -remarkable for diastolic dysfunction, outpatient follow up with Cardiology  Patient is asymptomatic from bradycardia  Continue nicotine patch   Reports being worked up for leukemia, blood counts normal, continue outpatient follow up with Hematology/Oncology  P.r.n.  "narcotics for pain   DVT prophylaxis-subQ Lovenox      Patient refusing all care including antibiotics, IV fluids.  She believes that medications are making her more sick.  Very hard to convince her and labs were pending at the time of my morning rounds and I told her I will come back and update her with labs.  I was later informed by the nursing staff that the patient continued to refuse care.  Labs came back which showed worsening acute kidney injury.  I went back inside the patient room.  Explained lab findings, need for IV fluids, need for antibiotics.  Offered CT abdomen/pelvis to further evaluate her GI symptoms and also offered nephrology evaluation. Explained the risk of not receiving appropriate care.  At this time patient became very aggressive towards me, using foul language is and accused me of "poisoning her with medication and fluid".  I came outside the room and went again inside the room with the nursing staff to tried  to convince her.  At this time patient was sitting on toilet, continued to be aggressive, loud and used found languages and slammed the door on both of us.  Continued to be very non receptive and continued to argument.  Myself and nursing staff exited the room when the aggression got worse and patient door was shut.  From outside we heard crashing noise  from inside the room and security was called.  Patient decided to leave [place] and she was escorted out of the hospital by security Leni Woods MD   10/08/2024                "

## 2024-10-08 NOTE — DISCHARGE SUMMARY
[facility] Hospital Medicine Discharge Summary    Admit Date: 10/2/2024  Discharge Date and Time: 10/8/92638:58 PM  Admitting Physician: KETTY Team  Discharging Physician: Leni Woods MD.  Primary Care Physician: Anu, Primary Doctor  Consults: {consultation: [id number]}    Discharge Diagnoses:  ***    Hospital Course:   ***  Pt was seen and examined on the day of discharge  Vitals:  VITAL SIGNS: 24 HRS MIN & MAX LAST   Temp  Min: 97.7 °F (36.5 °C)  Max: 98.9 °F (37.2 °C) 98.3 °F (36.8 °C)   BP  Min: 108/57  Max: 134/86 134/86   Pulse  Min: 50  Max: 84  84   Resp  Min: 17  Max: 20 18   SpO2  Min: 92 %  Max: 100 % (!) 92 %       Physical Exam:  ***    Procedures Performed: No admission procedures for hospital encounter.     Significant Diagnostic Studies: See Full reports for all details    Recent Labs   Lab 10/05/24  0528 10/07/24  0348 10/08/24  0857   WBC 8.49 17.64* 12.96*   RBC 3.47* 3.44* 3.15*   HGB 11.2* 11.3* 10.3*   HCT 34.7* 34.7* 31.6*   .0* 100.9* 100.3*   MCH 32.3* 32.8* 32.7*   MCHC 32.3* 32.6* 32.6*   RDW 12.8 12.4 12.7    192 212   MPV 10.4 10.9* 11.0*       Recent Labs   Lab 10/05/24  0528 10/07/24  0348 10/07/24  0857 10/08/24  0857    134* 134* 140   K 4.4 4.8 4.3 4.7    102 102 107   CO2 26 25 25 23   BUN 10.1 19.4* 20.7* 25.0*   CREATININE 0.76 3.38* 3.69* 4.27*   CALCIUM 8.2* 8.5 8.6 8.6   ALBUMIN 2.8* 2.9*  --  3.0*   ALKPHOS 83 121  --  107   ALT 16 38  --  28   AST 15 34  --  21   BILITOT 0.4 0.7  --  0.7        Microbiology Results (last 7 days)       Procedure Component Value Units Date/Time    Anaerobic Culture [[id number]] Collected: 10/06/24 1317    Order Status: Completed Specimen: Abscess from Breast, Right Updated: 10/08/24 0918     Anaerobe Culture No Anaerobes Isolated    Wound Culture [[id number]] Collected: 10/06/24 1317    Order Status: Completed Specimen: Abscess from Breast, Right Updated: 10/08/24 0831     Wound Culture No  Growth At 48 Hours    Blood Culture [[id number]]  (Normal) Collected: 10/03/24 2137    Order Status: Completed Specimen: Blood Updated: 10/07/24 2300     Blood Culture No Growth At 96 Hours    Blood Culture [[id number]]  (Normal) Collected: 10/03/24 2137    Order Status: Completed Specimen: Blood Updated: 10/07/24 2300     Blood Culture No Growth At 96 Hours    Gram Stain [[id number]] Collected: 10/06/24 1317    Order Status: Completed Specimen: Abscess from Breast, Right Updated: 10/06/24 1559     GRAM STAIN Few WBC observed      No bacteria seen             US Retroperitoneal Complete  Narrative: EXAMINATION:  US RETROPERITONEAL COMPLETE    CLINICAL HISTORY:  herber;    TECHNIQUE:  Ultrasound evaluation of the kidneys, region of the ureters, and urinary bladder    COMPARISON:  None    FINDINGS:  The right kidney measures 11.7 cm. The left kidney measures 10.9 cm. There is no hydronephrosis.    The urinary bladder is unremarkable.    No significant findings within the visualized segments of the abdominal aorta and IVC.  Impression: No hydronephrosis.    Electronically signed by: Alea Malik  Date:    10/07/2024  Time:    16:07         Medication List        ASK your doctor about these medications      HYDROcodone-acetaminophen 5-325 mg per tablet  Commonly known as: NORCO  Take 1 tablet by mouth every 6 (six) hours as needed for Pain.               Explained in detail to the patient about the discharge plan, medications, and follow-up visits. Pt understands and agrees with the treatment plan  Discharge Disposition: Left Against Medical Advice   Discharged Condition: stable  Diet-    Medications Per DC med rec  Activities as tolerated    For further questions contact hospitalist office    Discharge time 33 minutes    For worsening symptoms, chest pain, shortness of breath, increased abdominal pain, high grade fever, stroke or stroke like symptoms, immediately go to the nearest Emergency Room or call 911 as soon as  possible.      Leni Gonsalves M.D, on 10/8/2024. at 3:58 PM.

## 2024-10-08 NOTE — PLAN OF CARE
Problem: Adult Inpatient Plan of Care  Goal: Plan of Care Review  Outcome: Met  Goal: Patient-Specific Goal (Individualized)  Outcome: Met  Goal: Absence of Hospital-Acquired Illness or Injury  Outcome: Met  Goal: Optimal Comfort and Wellbeing  Outcome: Met  Goal: Readiness for Transition of Care  Outcome: Met     Problem: Fall Injury Risk  Goal: Absence of Fall and Fall-Related Injury  Outcome: Met     Problem: Wound  Goal: Optimal Coping  Outcome: Met  Goal: Optimal Functional Ability  Outcome: Met  Goal: Absence of Infection Signs and Symptoms  Outcome: Met  Goal: Improved Oral Intake  Outcome: Met  Goal: Optimal Pain Control and Function  Outcome: Met  Goal: Skin Health and Integrity  Outcome: Met  Goal: Optimal Wound Healing  Outcome: Met     Problem: Pain Acute  Goal: Optimal Pain Control and Function  Outcome: Met   Pt left AMA. POC complete

## 2024-10-08 NOTE — PROGRESS NOTES
Rounded on patient. She is still refusing all care. Does not want an IV restarted for renal health because she is going home. She isn't  interested in staying for care to improved her renal function. Explained the importance of allowing me to restart her IV . I was asked to leave her room and not come back.Dr Woods notified that patient is refusing all care. She will come in to speak to her.

## 2024-10-08 NOTE — PLAN OF CARE
10/08/24 1210   Final Note   Assessment Type Final Discharge Note   Anticipated Discharge Disposition Left Against     Left Against Medical Advise.

## 2024-10-09 LAB — BACTERIA WND CULT: NO GROWTH

## 2024-10-11 LAB
BACTERIA SPEC ANAEROBE CULT: ABNORMAL
BACTERIA SPEC ANAEROBE CULT: ABNORMAL

## 2024-10-17 ENCOUNTER — OFFICE VISIT (OUTPATIENT)
Dept: SURGERY | Facility: CLINIC | Age: 34
End: 2024-10-17
Payer: COMMERCIAL

## 2024-10-17 VITALS
HEART RATE: 53 BPM | TEMPERATURE: 99 F | BODY MASS INDEX: 41.02 KG/M2 | WEIGHT: 293 LBS | DIASTOLIC BLOOD PRESSURE: 84 MMHG | RESPIRATION RATE: 14 BRPM | OXYGEN SATURATION: 100 % | HEIGHT: 71 IN | SYSTOLIC BLOOD PRESSURE: 139 MMHG

## 2024-10-17 DIAGNOSIS — N61.1 ABSCESS OF RIGHT BREAST: Primary | ICD-10-CM

## 2024-10-17 PROCEDURE — 99999 PR PBB SHADOW E&M-EST. PATIENT-LVL III: CPT | Mod: PBBFAC,,, | Performed by: SURGERY

## 2024-10-17 NOTE — PROGRESS NOTES
[facility] Breast [facility]  Breast Surgical Oncology  Follow-Up Patient Office Visit       Referring Provider: No ref. provider found  PCP: No, Primary Doctor   Medical Oncologist: No care team member to display   Radiation Oncologist: No care team member to display       Patient Care Team:  No, Primary Doctor as PCP - General      Chief Complaint:   Chief Complaint   Patient presents with    Follow-up     Patient's here for left breast drain removal, denies breast pain, swelling, signs of infection        Subjective:   Treatment History:        Interval History:  10/17/2024 - Rene Alston     HPI:  Rene Alston is a 34 y.o. female who presents on 10/17/2024 status post incision and drainage of right breast abscess at [facility] on 10/6/24. Since that admission, she was subsequently hospitalized at [facility] for VIVIAN the following week.  Since time of that discharge, she reports feeling well, right breast much improved with minimal drainage from penrose. Denies fever, chills, systemic illness.         Imaging:   Soft tissue US performed during [facility] hospital stay with concern for abscess.   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     Past History:  No past medical history on file.     Past Surgical History:   Procedure Laterality Date    INCISION AND DRAINAGE OF ABSCESS  10/6/2024    Procedure: INCISION AND DRAINAGE, ABSCESS;  Surgeon: Naomi Ray MD;  Location: [facility];  Service: General;;    MASTOTOMY, WITH ABSCESS DRAINAGE Right 10/6/2024    Procedure: MASTOTOMY,WITH ABSCESS DRAINAGE;  Surgeon: Naomi Ray MD;  Location: [facility];  Service: General;  Laterality: Right;        Social History     Socioeconomic History    Marital status: Single   Tobacco Use    Smoking status: Former     Current packs/day: 0.00     Average  packs/day: 1 pack/day for 15.8 years (15.8 ttl pk-yrs)     Types: Cigarettes     Start date:      Quit date: 10/2/2024     Years since quittin.0    Smokeless tobacco: Never     Social Drivers of Health     Financial Resource Strain: High Risk (10/3/2024)    Overall Financial Resource Strain (CARDIA)     Difficulty of Paying Living Expenses: Hard   Food Insecurity: Food Insecurity Present (10/3/2024)    Hunger Vital Sign     Worried About Running Out of Food in the Last Year: Often true     Ran Out of Food in the Last Year: Often true   Transportation Needs: No Transportation Needs (10/3/2024)    TRANSPORTATION NEEDS     Transportation : No   Physical Activity: Inactive (10/3/2024)    Exercise Vital Sign     Days of Exercise per Week: 0 days     Minutes of Exercise per Session: 0 min   Stress: Stress Concern Present (10/3/2024)    [employer] - Occupational Stress Questionnaire     Feeling of Stress : To some extent   Housing Stability: High Risk (10/3/2024)    Housing Stability Vital Sign     Unable to Pay for Housing in the Last Year: Yes     Homeless in the Last Year: No        Body mass index is 49.01 kg/m².     Allergy/Medications:   Review of patient's allergies indicates:   Allergen Reactions    Iodine Rash and Itching    Nickel           Current Outpatient Medications:     HYDROcodone-acetaminophen (NORCO) 5-325 mg per tablet, Take 1 tablet by mouth every 6 (six) hours as needed for Pain. (Patient not taking: Reported on 10/17/2024), Disp: 12 tablet, Rfl: 0  No current facility-administered medications for this visit.       Review of Systems:  Review of Systems   Constitutional:  Negative for chills, fever, malaise/fatigue and weight loss.   Respiratory:  Negative for cough and hemoptysis.    Cardiovascular:  Negative for chest pain.   Skin:  Negative for itching and rash.           Objective:     Vitals:  Blood pressure 139/84, pulse (!) 53, temperature 98.5 °F (36.9 °C),  "temperature source Oral, resp. rate 14, height 5' 11" (1.803 m), weight (!) 159.4 kg (351 lb 6.4 oz), last menstrual period 09/16/2024, SpO2 100%.      Physical Exam:  General: The patient is awake, alert and oriented times three. The patient is well nourished and in no acute distress.  Neck: There is no evidence of palpable cervical, supraclavicular or axillary adenopathy. The neck is supple. The thyroid is not enlarged.  Musculoskeletal: The patient has a normal range of motion of her bilateral upper extremities.  Chest: Examination of the chest wall fails to reveal any obvious abnormalities. Nonlabored breathing, symmetric expansion.  Breast:  Right: medial periareloar incision is c/d/I, penrose drain sutured in place- removed today in clinic. No induration, no erythema, only scant serosanguinous fluid present  2 sutures remain in periareolar skin.   Abdomen: The abdomen is soft, flat, nontender and nondistended.  Integumentary: no rashes or skin lesions present  Neurologic: cranial nerves intact, no signs of peripheral neurological deficit, motor/sensory function intact          Assessment and Plan:   Ms. 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 Healing well from recent surgery, we discussed possibility she may need central duct excision as this disease process is frequently recurrent. I will see her back in 1 week, hopefully remove remaining sutures and plan from there    Encounter Diagnoses   Name Primary?    Abscess of right breast Yes               Plan:     Keep area clean and dry, change gauze at least daily, more frequently if becomes saturated.   Return to clinic for wound check in 1 week.   Call if signs/symptoms of recurrent infection.       All of questions were answered    Naomi Ray MD  Breast Surgery    OFFICE VISIT CODING:    Non-face-to-face time included:  Yes Preparing to see the patient such as reviewing the patient " record  Yes Obtaining and reviewing separately obtained history  Yes Independently interpreting results  Yes Documenting clinical information in electronic health record  Yes Ordering appropriate medications  Yes Ordering appropriate tests  Yes Ordering appropriate procedures (including follow-up)  Yes Referring and communicating with other health care professionals (not separately reported)  No Care Coordination (not separately reported)    Face-to-face time included:  Yes Performing a medically necessary appropriate history, examination, and/or evaluation  Yes Communicating results to the patient/family/caregiver  Yes Counseling and educating the patient/family/caregiver  Yes Answering patient/family/caregiver questions    Total Time: 25 minutes    Total time includes both face-to-face and non-face-to-face time personally spent by myself on the day of the visit.

## 2025-02-03 ENCOUNTER — DOCUMENTATION ONLY (OUTPATIENT)
Dept: SURGERY | Facility: CLINIC | Age: 35
End: 2025-02-03
Payer: COMMERCIAL

## 2025-02-03 NOTE — PROGRESS NOTES
Patient called this morning with complaints of right breast swollen, red, and very painful to the point she is unable to wear a bra.  Patient states that she has been dealing with these symptoms for 2 days.  Informed patient that she should be seen today in the office. Patient states that she is at work, and unable to leave. She also states that she has transportation issues. Patient  would like to be seen on Thursday by Dr. Ray. Appointment made for 2/6/2025 at 1120.

## 2025-02-06 ENCOUNTER — OFFICE VISIT (OUTPATIENT)
Dept: SURGERY | Facility: CLINIC | Age: 35
End: 2025-02-06
Payer: COMMERCIAL

## 2025-02-06 VITALS
SYSTOLIC BLOOD PRESSURE: 129 MMHG | WEIGHT: 293 LBS | RESPIRATION RATE: 18 BRPM | DIASTOLIC BLOOD PRESSURE: 84 MMHG | BODY MASS INDEX: 39.68 KG/M2 | OXYGEN SATURATION: 100 % | HEART RATE: 65 BPM | TEMPERATURE: 99 F | HEIGHT: 72 IN

## 2025-02-06 DIAGNOSIS — N61.1 ABSCESS OF RIGHT BREAST: Primary | ICD-10-CM

## 2025-02-06 PROCEDURE — 3074F SYST BP LT 130 MM HG: CPT | Mod: CPTII,S$GLB,, | Performed by: SURGERY

## 2025-02-06 PROCEDURE — 99213 OFFICE O/P EST LOW 20 MIN: CPT | Mod: S$GLB,,, | Performed by: SURGERY

## 2025-02-06 PROCEDURE — 3079F DIAST BP 80-89 MM HG: CPT | Mod: CPTII,S$GLB,, | Performed by: SURGERY

## 2025-02-06 PROCEDURE — 1159F MED LIST DOCD IN RCRD: CPT | Mod: CPTII,S$GLB,, | Performed by: SURGERY

## 2025-02-06 PROCEDURE — 3008F BODY MASS INDEX DOCD: CPT | Mod: CPTII,S$GLB,, | Performed by: SURGERY

## 2025-02-06 PROCEDURE — 99999 PR PBB SHADOW E&M-EST. PATIENT-LVL III: CPT | Mod: PBBFAC,,, | Performed by: SURGERY

## 2025-02-06 RX ORDER — TRAMADOL HYDROCHLORIDE 50 MG/1
50 TABLET ORAL EVERY 6 HOURS PRN
Qty: 28 TABLET | Refills: 0 | Status: SHIPPED | OUTPATIENT
Start: 2025-02-06 | End: 2025-02-13

## 2025-02-06 RX ORDER — SULFAMETHOXAZOLE AND TRIMETHOPRIM 800; 160 MG/1; MG/1
1 TABLET ORAL 2 TIMES DAILY
Qty: 20 TABLET | Refills: 0 | Status: SHIPPED | OUTPATIENT
Start: 2025-02-06 | End: 2025-02-13 | Stop reason: SDUPTHER

## 2025-02-06 NOTE — PROGRESS NOTES
[facility] Breast [facility]  Breast Surgical Oncology  Patient Office Visit       Referring Provider: No ref. provider found  PCP: No, Primary Doctor   Medical Oncologist: No care team member to display   Radiation Oncologist: No care team member to display       Patient Care Team:  No, Primary Doctor as PCP - General      Chief Complaint:   Chief Complaint   Patient presents with    Breast Pain     Right Breast Pain; Redness        Subjective:   Treatment History:        Interval History:  2/6/2025 - Rene Alston     HPI:  Rene Alston is a 35 y.o. female who presents on 2/6/2025 with pain swelling and redness to the right breast, near the areola beginning this time on Sunday Feb 2. These symptoms are identical to the beginning of her last bright subareolar abscess- She was last seen in October status post incision and drainage of right breast abscess at [facility] on 10/6/24. Since that admission, she was subsequently hospitalized at [facility] for VIVIAN the following week with antibiotic related nephropathy.   At the time of her incision and drainage in October, I advised her that infection was likely to recur, and we should consider central duct excision once acute infection resolved.     Imaging:   None at this time    Pathology:  None current    Family History:  No family history on file.     Past History:  History reviewed. No pertinent past medical history.     Past Surgical History:   Procedure Laterality Date    INCISION AND DRAINAGE OF ABSCESS  10/6/2024    Procedure: INCISION AND DRAINAGE, ABSCESS;  Surgeon: Naomi Ray MD;  Location: [facility];  Service: General;;    MASTOTOMY, WITH ABSCESS DRAINAGE Right 10/6/2024    Procedure: MASTOTOMY,WITH ABSCESS DRAINAGE;  Surgeon: Naomi Ray MD;  Location: [facility];  Service: General;  Laterality: Right;        Social History     Socioeconomic History    Marital status: Single   Tobacco Use    Smoking status: Every Day     Current  packs/day: 0.00     Average packs/day: 1 pack/day for 15.8 years (15.8 ttl pk-yrs)     Types: Cigarettes     Start date:      Last attempt to quit: 10/2/2024     Years since quittin.3    Smokeless tobacco: Never   Substance and Sexual Activity    Drug use: Yes     Types: Marijuana     Comment: Medical Marijuana Use     Social Drivers of Health     Financial Resource Strain: High Risk (10/3/2024)    Overall Financial Resource Strain (CARDIA)     Difficulty of Paying Living Expenses: Hard   Food Insecurity: Food Insecurity Present (10/3/2024)    Hunger Vital Sign     Worried About Running Out of Food in the Last Year: Often true     Ran Out of Food in the Last Year: Often true   Transportation Needs: No Transportation Needs (10/3/2024)    TRANSPORTATION NEEDS     Transportation : No   Physical Activity: Inactive (10/3/2024)    Exercise Vital Sign     Days of Exercise per Week: 0 days     Minutes of Exercise per Session: 0 min   Stress: Stress Concern Present (10/3/2024)    [employer] - Occupational Stress Questionnaire     Feeling of Stress : To some extent   Housing Stability: High Risk (10/3/2024)    Housing Stability Vital Sign     Unable to Pay for Housing in the Last Year: Yes     Homeless in the Last Year: No        Body mass index is 48.37 kg/m².     Allergy/Medications:   Review of patient's allergies indicates:   Allergen Reactions    Iodine Rash and Itching    Nickel           Current Outpatient Medications:     HYDROcodone-acetaminophen (NORCO) 5-325 mg per tablet, Take 1 tablet by mouth every 6 (six) hours as needed for Pain. (Patient not taking: Reported on 2025), Disp: 12 tablet, Rfl: 0    sulfamethoxazole-trimethoprim 800-160mg (BACTRIM DS) 800-160 mg Tab, Take 1 tablet by mouth 2 (two) times daily. for 10 days, Disp: 20 tablet, Rfl: 0    traMADoL (ULTRAM) 50 mg tablet, Take 1 tablet (50 mg total) by mouth every 6 (six) hours as needed for Pain., Disp: 28  "tablet, Rfl: 0       Review of Systems:  Review of Systems   Constitutional:  Negative for chills, fever, malaise/fatigue and weight loss.   Respiratory:  Negative for cough and hemoptysis.    Cardiovascular:  Negative for chest pain.   Skin:  Negative for itching and rash.        Swelling, redness, pain to right breast since sunday           Objective:     Vitals:  Blood pressure 129/84, pulse 65, temperature 98.7 °F (37.1 °C), temperature source Oral, resp. rate 18, height 6' 3" (1.905 m), weight (!) 175.5 kg (387 lb), SpO2 100%.      Physical Exam:  General: The patient is awake, alert and oriented times three. The patient is well nourished and in no acute distress.  Neck: There is no evidence of palpable cervical, supraclavicular or axillary adenopathy. The neck is supple. The thyroid is not enlarged.  Musculoskeletal: The patient has a normal range of motion of her bilateral upper extremities.  Chest: Examination of the chest wall fails to reveal any obvious abnormalities. Nonlabored breathing, symmetric expansion.  Breast:  Right: medial superior periareloar  phlegmon present. Approximately 6cm circumfirential area of induration and tenderness without any palpable fluctuance.   Left : no abnormalities noted   Abdomen: The abdomen is soft, flat, nontender and nondistended.  Integumentary: no rashes or skin lesions present  Neurologic: cranial nerves intact, no signs of peripheral neurological deficit, motor/sensory function intact          Assessment and Plan:   Ms. 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 Not a drainable collection presently, I will see her back in 1 week after a week of oral antibiotic therapy to discuss next step. We again discussed possibility she may need central duct excision as this disease process is frequently recurrent. I will see her back in 1 week, advised her to present to ED should systemic symptoms " occur, such as fever, chills, etc, or if the pain and infection worsens.     Encounter Diagnoses   Name Primary?    Abscess of right breast Yes               Plan:     Ultram for pain modulation and bactrim for bacterial breast infection called into patient's pharmacy.   Work excuse note provided.  Return to clinic for interval check in 1 week.   Call if signs/symptoms of worsening infection.       All of questions were answered    Naomi Ray MD  Breast Surgery    OFFICE VISIT CODING:    Non-face-to-face time included:  Yes Preparing to see the patient such as reviewing the patient record  Yes Obtaining and reviewing separately obtained history  Yes Independently interpreting results  Yes Documenting clinical information in electronic health record  Yes Ordering appropriate medications  Yes Ordering appropriate tests  Yes Ordering appropriate procedures (including follow-up)  Yes Referring and communicating with other health care professionals (not separately reported)  No Care Coordination (not separately reported)    Face-to-face time included:  Yes Performing a medically necessary appropriate history, examination, and/or evaluation  Yes Communicating results to the patient/family/caregiver  Yes Counseling and educating the patient/family/caregiver  Yes Answering patient/family/caregiver questions    Total Time: 25 minutes    Total time includes both face-to-face and non-face-to-face time personally spent by myself on the day of the visit.

## 2025-02-13 ENCOUNTER — OFFICE VISIT (OUTPATIENT)
Dept: SURGERY | Facility: CLINIC | Age: 35
End: 2025-02-13
Payer: COMMERCIAL

## 2025-02-13 VITALS
BODY MASS INDEX: 39.68 KG/M2 | HEART RATE: 77 BPM | WEIGHT: 293 LBS | SYSTOLIC BLOOD PRESSURE: 121 MMHG | RESPIRATION RATE: 18 BRPM | HEIGHT: 72 IN | DIASTOLIC BLOOD PRESSURE: 84 MMHG | TEMPERATURE: 98 F | OXYGEN SATURATION: 99 %

## 2025-02-13 DIAGNOSIS — N61.1 ABSCESS OF RIGHT BREAST: ICD-10-CM

## 2025-02-13 DIAGNOSIS — F17.200 SMOKING ADDICTION: Primary | ICD-10-CM

## 2025-02-13 PROCEDURE — 99999 PR PBB SHADOW E&M-EST. PATIENT-LVL IV: CPT | Mod: PBBFAC,,, | Performed by: SURGERY

## 2025-02-13 RX ORDER — FLUCONAZOLE 150 MG/1
TABLET ORAL
COMMUNITY
Start: 2025-01-13

## 2025-02-13 RX ORDER — BUSPIRONE HYDROCHLORIDE 10 MG/1
10 TABLET ORAL
COMMUNITY
Start: 2025-01-14

## 2025-02-13 RX ORDER — NYSTATIN 100000 U/G
CREAM TOPICAL 3 TIMES DAILY
COMMUNITY
Start: 2025-01-14

## 2025-02-13 RX ORDER — SULFAMETHOXAZOLE AND TRIMETHOPRIM 800; 160 MG/1; MG/1
1 TABLET ORAL 2 TIMES DAILY
Qty: 20 TABLET | Refills: 0 | Status: SHIPPED | OUTPATIENT
Start: 2025-02-13 | End: 2025-02-23

## 2025-02-13 NOTE — LETTER
February 13, 2025      [facility]  [address]  [place] [address]  Phone: [phone]  Fax: [phone]       Patient: Rene Alston   YOB: 1990  Date of Visit: 02/13/2025    To Whom It May Concern:    Deloris Alston  was at [facility] on 02/13/2025. Please excuse patient for 02/12/2025 and for today's visit. The patient may return to work/school on 02/13/2025 with no restrictions. If you have any questions or concerns, or if I can be of further assistance, please do not hesitate to contact me.     Sincerely,    Deanna Garduno MA

## 2025-02-13 NOTE — PROGRESS NOTES
[facility] [place] [facility]  Breast Surgical Oncology  Patient Office Visit       Referring Provider: No ref. provider found  PCP: No, Primary Doctor   Medical Oncologist: No care team member to display   Radiation Oncologist: No care team member to display       Patient Care Team:  No, Primary Doctor as PCP - General      Chief Complaint:   Chief Complaint   Patient presents with    Follow-up     Patient reports right breast abscess burst with green, bloody drainage, denies pain, no redness        Subjective:   Treatment History:        Interval History:  2/13/2025 - Rene Alston     HPI:  Rene Alston is a 35 y.o. female who presents on 2/13/2025 in follow up for right breast abscess. Since last visit she has been taking Bactrim and yesterday the 'boil' ruptured to her right breast. She has also had a smaller area drain on the left breast.     Last visit 1 week ago she noted pain swelling and redness to the right breast, near the areola beginning this time on Sunday Feb 2. These symptoms are identical to the beginning of her last bright subareolar abscess- She was last seen in October status post incision and drainage of right breast abscess at [facility] on 10/6/24. Since that admission, she was subsequently hospitalized at [facility] for VIVIAN the following week with antibiotic related nephropathy.   At the time of her incision and drainage in October, I advised her that infection was likely to recur, and we should consider central duct excision once acute infection resolved.     Imaging:   None at this time    Pathology:  None current    Family History:  No family history on file.     Past History:  History reviewed. No pertinent past medical history.     Past Surgical History:   Procedure Laterality Date    INCISION AND DRAINAGE OF ABSCESS  10/6/2024    Procedure: INCISION AND DRAINAGE, ABSCESS;  Surgeon: Naomi Ray MD;  Location: [facility];  Service: General;;    MASTOTOMY, WITH  ABSCESS DRAINAGE Right 10/6/2024    Procedure: MASTOTOMY,WITH ABSCESS DRAINAGE;  Surgeon: Naomi Ray MD;  Location: [facility];  Service: General;  Laterality: Right;        Social History     Socioeconomic History    Marital status: Single   Tobacco Use    Smoking status: Every Day     Current packs/day: 0.00     Average packs/day: 1 pack/day for 15.8 years (15.8 ttl pk-yrs)     Types: Cigarettes     Start date:      Last attempt to quit: 10/2/2024     Years since quittin.3    Smokeless tobacco: Never    Tobacco comments:     25-patient is ready to quit smoking and wants to be referred to the smoking cessation program   Substance and Sexual Activity    Drug use: Yes     Types: Marijuana     Comment: Medical Marijuana Use     Social Drivers of Health     Financial Resource Strain: High Risk (10/3/2024)    Overall Financial Resource Strain (CARDIA)     Difficulty of Paying Living Expenses: Hard   Food Insecurity: Food Insecurity Present (10/3/2024)    Hunger Vital Sign     Worried About Running Out of Food in the Last Year: Often true     Ran Out of Food in the Last Year: Often true   Transportation Needs: No Transportation Needs (10/3/2024)    TRANSPORTATION NEEDS     Transportation : No   Physical Activity: Inactive (10/3/2024)    Exercise Vital Sign     Days of Exercise per Week: 0 days     Minutes of Exercise per Session: 0 min   Stress: Stress Concern Present (10/3/2024)    [employer] - Occupational Stress Questionnaire     Feeling of Stress : To some extent   Housing Stability: High Risk (10/3/2024)    Housing Stability Vital Sign     Unable to Pay for Housing in the Last Year: Yes     Homeless in the Last Year: No        Body mass index is 48.17 kg/m².     Allergy/Medications:   Review of patient's allergies indicates:   Allergen Reactions    Iodine Rash and Itching    Nickel           Current Outpatient Medications:     busPIRone (BUSPAR) 10 MG tablet, Take 10 mg  "by mouth., Disp: , Rfl:     fluconazole (DIFLUCAN) 150 MG Tab, Take by mouth., Disp: , Rfl:     nystatin (MYCOSTATIN) cream, Apply topically 3 (three) times daily., Disp: , Rfl:     traMADoL (ULTRAM) 50 mg tablet, Take 1 tablet (50 mg total) by mouth every 6 (six) hours as needed for Pain., Disp: 28 tablet, Rfl: 0    HYDROcodone-acetaminophen (NORCO) 5-325 mg per tablet, Take 1 tablet by mouth every 6 (six) hours as needed for Pain. (Patient not taking: Reported on 10/17/2024), Disp: 12 tablet, Rfl: 0    sulfamethoxazole-trimethoprim 800-160mg (BACTRIM DS) 800-160 mg Tab, Take 1 tablet by mouth 2 (two) times daily. for 10 days, Disp: 20 tablet, Rfl: 0       Review of Systems:  Review of Systems   Constitutional:  Negative for chills, fever, malaise/fatigue and weight loss.   Respiratory:  Negative for cough and hemoptysis.    Cardiovascular:  Negative for chest pain.   Skin:  Negative for itching and rash.        Swelling, redness, pain to right breast since sunday           Objective:     Vitals:  Blood pressure 121/84, pulse 77, temperature 98.1 °F (36.7 °C), temperature source Oral, resp. rate 18, height 6' 3" (1.905 m), weight (!) 174.8 kg (385 lb 6.4 oz), last menstrual period 02/12/2025, SpO2 99%.      Physical Exam:  General: The patient is awake, alert and oriented times three. The patient is well nourished and in no acute distress.  Neck: There is no evidence of palpable cervical, supraclavicular or axillary adenopathy. The neck is supple. The thyroid is not enlarged.  Musculoskeletal: The patient has a normal range of motion of her bilateral upper extremities.  Chest: Examination of the chest wall fails to reveal any obvious abnormalities. Nonlabored breathing, symmetric expansion.  Breast:  Right: medial superior periareolar opening present, draining purulent material upon palpation. Nontender at this point Approximately 6cm circumfirential area of induration and tenderness without any palpable " fluctuance.   Left : 1100clock area of scab consistent with ruptured superficial abscess, no further purulence noted, no surrounding erythema or induration  Abdomen: The abdomen is soft, flat, nontender and nondistended.  Integumentary: no rashes or skin lesions present  Neurologic: cranial nerves intact, no signs of peripheral neurological deficit, motor/sensory function intact          Assessment and Plan:   Ms. 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 Right breast abscess spontaneously drained and she reports significant relief of pain and tenderness. I will see her back in 1 week after an additional week of oral antibiotic therapy to discuss next step. We again discussed possibility she may need central duct excision as this disease process is frequently recurrent. I will see her back in 1 week, advised her to present to ED should systemic symptoms occur, such as fever, chills, etc, or if the pain and infection worsens.     Recurrent bilateral subareolar breast abscesses.              Plan:     Ultram for pain modulation and bactrim for bacterial breast infection called into patient's pharmacy.   Work excuse note provided.  Return to clinic for interval check in 1 week.   Call if signs/symptoms of worsening infection, systemic symptoms.   Advised to continue warm compresses, warm soak to encourage abscess to drain.     All of questions were answered    Naomi Ray MD  Breast Surgery    OFFICE VISIT CODING:    Non-face-to-face time included:  Yes Preparing to see the patient such as reviewing the patient record  Yes Obtaining and reviewing separately obtained history  Yes Independently interpreting results  Yes Documenting clinical information in electronic health record  Yes Ordering appropriate medications  Yes Ordering appropriate tests  Yes Ordering appropriate procedures (including follow-up)  Yes Referring and  communicating with other health care professionals (not separately reported)  No Care Coordination (not separately reported)    Face-to-face time included:  Yes Performing a medically necessary appropriate history, examination, and/or evaluation  Yes Communicating results to the patient/family/caregiver  Yes Counseling and educating the patient/family/caregiver  Yes Answering patient/family/caregiver questions    Total Time: 25 minutes    Total time includes both face-to-face and non-face-to-face time personally spent by myself on the day of the visit.

## 2025-02-24 ENCOUNTER — TELEPHONE (OUTPATIENT)
Dept: SMOKING CESSATION | Facility: CLINIC | Age: 35
End: 2025-02-24
Payer: COMMERCIAL

## 2025-02-24 ENCOUNTER — CLINICAL SUPPORT (OUTPATIENT)
Dept: SMOKING CESSATION | Facility: CLINIC | Age: 35
End: 2025-02-24

## 2025-02-24 ENCOUNTER — OFFICE VISIT (OUTPATIENT)
Dept: SURGERY | Facility: CLINIC | Age: 35
End: 2025-02-24
Payer: COMMERCIAL

## 2025-02-24 VITALS
OXYGEN SATURATION: 98 % | WEIGHT: 293 LBS | RESPIRATION RATE: 18 BRPM | HEART RATE: 67 BPM | TEMPERATURE: 98 F | SYSTOLIC BLOOD PRESSURE: 125 MMHG | DIASTOLIC BLOOD PRESSURE: 84 MMHG | HEIGHT: 72 IN | BODY MASS INDEX: 39.68 KG/M2

## 2025-02-24 DIAGNOSIS — F17.200 NICOTINE DEPENDENCE: Primary | ICD-10-CM

## 2025-02-24 DIAGNOSIS — Z76.89 ENCOUNTER TO ESTABLISH CARE: ICD-10-CM

## 2025-02-24 DIAGNOSIS — N61.1 ABSCESS OF BREAST: Primary | ICD-10-CM

## 2025-02-24 PROCEDURE — 99213 OFFICE O/P EST LOW 20 MIN: CPT | Mod: S$GLB,,, | Performed by: SURGERY

## 2025-02-24 PROCEDURE — 3008F BODY MASS INDEX DOCD: CPT | Mod: CPTII,S$GLB,, | Performed by: SURGERY

## 2025-02-24 PROCEDURE — 3079F DIAST BP 80-89 MM HG: CPT | Mod: CPTII,S$GLB,, | Performed by: SURGERY

## 2025-02-24 PROCEDURE — 3074F SYST BP LT 130 MM HG: CPT | Mod: CPTII,S$GLB,, | Performed by: SURGERY

## 2025-02-24 PROCEDURE — 99999 PR PBB SHADOW E&M-EST. PATIENT-LVL IV: CPT | Mod: PBBFAC,,, | Performed by: SURGERY

## 2025-02-24 RX ORDER — NYSTATIN 100000 [USP'U]/G
POWDER TOPICAL
COMMUNITY
Start: 2025-02-21

## 2025-02-24 RX ORDER — DM/P-EPHED/ACETAMINOPH/DOXYLAM 30-7.5/3
2 LIQUID (ML) ORAL
Qty: 144 LOZENGE | Refills: 0 | Status: SHIPPED | OUTPATIENT
Start: 2025-02-24

## 2025-02-24 NOTE — PROGRESS NOTES
Individual Follow-Up Form    2/24/2025    Quit Date: ***    Clinical Status of Patient: Outpatient    Length of Service: {Smoking Length of Service:32026}    Continuing Medication: {Smoking Cessation Yes/No with medication:62080}    Other Medications: ***     Target Symptoms: Withdrawal and medication side effects. The following were  rated moderate (3) to severe (4) on TCRS:  Moderate (3): ***  Severe (4): ***    Comments: ***    Diagnosis: {Smoking Cessation Diagnosis:19563}    Next Visit: {Smoking Cessation Next Phone Intervention:33262}

## 2025-02-24 NOTE — PROGRESS NOTES
Spoke with patient via phone this afternoon. Patient will be participating in biweekly tobacco cessation meetings and will begin the prescribed tobacco cessation medication regimen of  2 mg nicotine lozenge  PRN (1-2 per hour in place of cigarettes). Patient currently smokes 20 cigarettes per day.  FTND score of 6 indicates a high level of nicotine dependence, ELIANA-D score of 0  perceived as no degree of mental distress /probable depression. Pt currently smokes in her home. Pt will work on making home smoke free. Discussed and reviewed with patient the role of tobacco cessation program, role of nicotine replacement therapy  (NRT), medication along with behavioral therapy & counseling to assist the patient to reach her goal of being tobacco free. Education and instruction on the role of the NRT and possible side effects.  Reviewed behavioral modification strategy of rate reduction and wait time of 15 min prior to smoking. Patient instructed to call me with any questions or concerns.

## 2025-02-24 NOTE — PROGRESS NOTES
[facility] [place] [facility]  Breast Surgical Oncology  Patient Office Visit       Referring Provider: No ref. provider found  PCP: No, Primary Doctor   Medical Oncologist: No care team member to display   Radiation Oncologist: No care team member to display       Patient Care Team:  No, Primary Doctor as PCP - General      Chief Complaint:   Chief Complaint   Patient presents with    Follow-up     Patient reports no changes in right breast abscess, denies any swelling, redness, signs of infection        Subjective:   Treatment History:        Interval History:  2/24/2025 - Rene Alston     HPI:  Rene Alston is a 35 y.o. female who presents on 2/24/2025 in follow up for right breast abscess. Much improved today, no pain, no wound, she reports she feels back to baseline.     Last visit 1 week ago she noted the 'boil' ruptured to her right breast. She has also had a smaller area drain on the left breast. pain swelling and redness to the right breast, near the areola beginning this time on Sunday Feb 2. These symptoms are identical to the beginning of her last bright subareolar abscess- She was last seen in October status post incision and drainage of right breast abscess at [facility] on 10/6/24. Since that admission, she was subsequently hospitalized at [facility] for VIVIAN the following week with antibiotic related nephropathy.   At the time of her incision and drainage in October, I advised her that infection was likely to recur, and we should consider central duct excision once acute infection resolved.     Imaging:   None at this time    Pathology:  None current    Family History:  No family history on file.     Past History:  Past Medical History:   Diagnosis Date    ADHD (attention deficit hyperactivity disorder)     Anxiety     Arthritis     Cancer     Depression     Encounter for blood transfusion         Past Surgical History:   Procedure Laterality Date    INCISION AND DRAINAGE OF  ABSCESS  10/6/2024    Procedure: INCISION AND DRAINAGE, ABSCESS;  Surgeon: Naomi Ray MD;  Location: [facility] OR;  Service: General;;    MASTOTOMY, WITH ABSCESS DRAINAGE Right 10/6/2024    Procedure: MASTOTOMY,WITH ABSCESS DRAINAGE;  Surgeon: Naomi Ray MD;  Location: [facility] OR;  Service: General;  Laterality: Right;        Social History     Socioeconomic History    Marital status: Single   Tobacco Use    Smoking status: Every Day     Current packs/day: 1.00     Average packs/day: 0.5 packs/day for 13.4 years (6.6 ttl pk-yrs)     Types: Cigarettes     Start date: 2009     Last attempt to quit: 12/2/2024     Passive exposure: Current    Smokeless tobacco: Never    Tobacco comments:     2/13/25-patient is ready to quit smoking and wants to be referred to the smoking cessation program   Substance and Sexual Activity    Drug use: Yes     Types: Marijuana     Comment: Medical Marijuana Use     Social Drivers of Health     Financial Resource Strain: High Risk (10/3/2024)    Overall Financial Resource Strain (CARDIA)     Difficulty of Paying Living Expenses: Hard   Food Insecurity: Food Insecurity Present (10/3/2024)    Hunger Vital Sign     Worried About Running Out of Food in the Last Year: Often true     Ran Out of Food in the Last Year: Often true   Transportation Needs: No Transportation Needs (10/3/2024)    TRANSPORTATION NEEDS     Transportation : No   Physical Activity: Inactive (10/3/2024)    Exercise Vital Sign     Days of Exercise per Week: 0 days     Minutes of Exercise per Session: 0 min   Stress: Stress Concern Present (10/3/2024)    [employer] - Occupational Stress Questionnaire     Feeling of Stress : To some extent   Housing Stability: High Risk (10/3/2024)    Housing Stability Vital Sign     Unable to Pay for Housing in the Last Year: Yes     Homeless in the Last Year: No        Body mass index is 48.72 kg/m².     Allergy/Medications:   Review of patient's allergies  "indicates:   Allergen Reactions    Iodine Rash and Itching    Nickel           Current Outpatient Medications:     busPIRone (BUSPAR) 10 MG tablet, Take 10 mg by mouth., Disp: , Rfl:     fluconazole (DIFLUCAN) 150 MG Tab, Take by mouth., Disp: , Rfl:     HYDROcodone-acetaminophen (NORCO) 5-325 mg per tablet, Take 1 tablet by mouth every 6 (six) hours as needed for Pain., Disp: 12 tablet, Rfl: 0    nystatin (MYCOSTATIN) cream, Apply topically 3 (three) times daily., Disp: , Rfl:     nystatin (MYCOSTATIN) powder, SMARTSI Topical Twice Daily, Disp: , Rfl:        Review of Systems:  Review of Systems   Constitutional:  Negative for chills, fever, malaise/fatigue and weight loss.   Respiratory:  Negative for cough and hemoptysis.    Cardiovascular:  Negative for chest pain.   Skin:  Negative for itching and rash.           Objective:     Vitals:  Blood pressure 125/84, pulse 67, temperature 98.2 °F (36.8 °C), temperature source Oral, resp. rate 18, height 6' 3" (1.905 m), weight (!) 176.8 kg (389 lb 12.8 oz), last menstrual period 2025, SpO2 98%.      Physical Exam:  General: The patient is awake, alert and oriented times three. The patient is well nourished and in no acute distress.  Neck: There is no evidence of palpable cervical, supraclavicular or axillary adenopathy. The neck is supple. The thyroid is not enlarged.  Musculoskeletal: The patient has a normal range of motion of her bilateral upper extremities.  Chest: Examination of the chest wall fails to reveal any obvious abnormalities. Nonlabored breathing, symmetric expansion.  Breast:  Right: medial superior periareolar area no longer open, skin is well healed with small 3cm residual area of induration without any palpable fluctuance.   Left : no masses, skin healed, no further signs of infection, no further purulence noted, no surrounding erythema or induration  Abdomen: The abdomen is soft, flat, nontender and nondistended.  Integumentary: no rashes " or skin lesions present  Neurologic: cranial nerves intact, no signs of peripheral neurological deficit, motor/sensory function intact          Assessment and Plan:   Ms. 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 Bilateral breasts remarkable improved, no active infection or significant inflammation noted today.  We again discussed she consider central duct excision as this disease process is frequently recurrent. I will see her back in month , advised her to present to ED should systemic symptoms occur, such as fever, chills, etc, or if the pain and infection worsens.     Recurrent bilateral subareolar breast abscesses.       She is attempting to quit smoking, reports successfully cutting back substantially. We discussed the impact that this will have on wound healing, encouraged her to continue to work toward cessation.        Plan:     Bilateral breast abscesses resolved/ dormant.   Will obtain 1 month ultrasound imaging and have patient return to clinic with result to discuss surgical planning- central duct excision either unilateral right vs bilateral due to chronic subareolar abscesses.    Referral placed to establish PCP.    All of questions were answered    Naomi Ray MD  Breast Surgery    OFFICE VISIT CODING:    Non-face-to-face time included:  Yes Preparing to see the patient such as reviewing the patient record  Yes Obtaining and reviewing separately obtained history  Yes Independently interpreting results  Yes Documenting clinical information in electronic health record  Yes Ordering appropriate medications  Yes Ordering appropriate tests  Yes Ordering appropriate procedures (including follow-up)  Yes Referring and communicating with other health care professionals (not separately reported)  No Care Coordination (not separately reported)    Face-to-face time included:  Yes Performing a medically necessary appropriate  history, examination, and/or evaluation  Yes Communicating results to the patient/family/caregiver  Yes Counseling and educating the patient/family/caregiver  Yes Answering patient/family/caregiver questions    Total Time: 25 minutes    Total time includes both face-to-face and non-face-to-face time personally spent by myself on the day of the visit.

## 2025-03-10 ENCOUNTER — CLINICAL SUPPORT (OUTPATIENT)
Dept: SMOKING CESSATION | Facility: CLINIC | Age: 35
End: 2025-03-10

## 2025-03-10 DIAGNOSIS — F17.200 NICOTINE DEPENDENCE: Primary | ICD-10-CM

## 2025-03-10 PROCEDURE — 99404 PREV MED CNSL INDIV APPRX 60: CPT | Mod: S$GLB,,, | Performed by: INTERNAL MEDICINE

## 2025-03-10 RX ORDER — IBUPROFEN 200 MG
1 TABLET ORAL DAILY
Qty: 28 PATCH | Refills: 0 | Status: SHIPPED | OUTPATIENT
Start: 2025-03-10

## 2025-03-10 NOTE — PROGRESS NOTES
Individual Follow-Up Form    3/10/2025    Quit Date:     Clinical Status of Patient: Outpatient    Length of Service: 60 minutes    Continuing Medication: yes  Nicotine Lozenges    Other Medications: Patches     Target Symptoms: Withdrawal and medication side effects. The following were  rated moderate (3) to severe (4) on TCRS:  Moderate (3): Urges/ Cravings  Severe (4): None    Comments: Spoke with patient via phone this afternoon in regards to smoking cessation progress update. Patient is currently smoking 18 cigarettes per day. Pt remains on tobacco cessation medication of 2 mg nicotine lozenge / gum PRN (1-2 per hour in place of cigarettes.) No adverse effects noted at this time. Pt is willing to try nicotine patches. Will order today. Pt is still smoking immediately upon waking up. Pt will work on 15 minute wait between thought and action. Pt will also work on breathing exercises. Pt not doing well with rate reduction and wait times prior to smoking. Reviewed coping strategies/habitual behavior/relapse prevention with patient. Reviewed learned addiction model, personal reasons for quitting, medications, goals, quit date. Unable to get carbon monoxide level due to virtual visit. The patient denies any abnormal behavioral or mental changes at this time.      Diagnosis: F17.200    Next Visit: 2 weeks

## 2025-03-20 ENCOUNTER — HOSPITAL ENCOUNTER (OUTPATIENT)
Dept: RADIOLOGY | Facility: HOSPITAL | Age: 35
Discharge: HOME | End: 2025-03-20
Attending: SURGERY
Payer: COMMERCIAL

## 2025-03-20 VITALS — WEIGHT: 293 LBS | HEIGHT: 72 IN | BODY MASS INDEX: 39.68 KG/M2

## 2025-03-20 DIAGNOSIS — N61.1 BREAST ABSCESS: ICD-10-CM

## 2025-03-20 DIAGNOSIS — N61.1 ABSCESS OF BREAST: ICD-10-CM

## 2025-03-20 PROCEDURE — 76642 ULTRASOUND BREAST LIMITED: CPT | Mod: TC,50

## 2025-03-20 PROCEDURE — 77066 DX MAMMO INCL CAD BI: CPT | Mod: TC

## 2025-03-20 PROCEDURE — 77062 BREAST TOMOSYNTHESIS BI: CPT | Mod: 26,,, | Performed by: RADIOLOGY

## 2025-03-20 PROCEDURE — 77066 DX MAMMO INCL CAD BI: CPT | Mod: 26,,, | Performed by: RADIOLOGY

## 2025-03-25 ENCOUNTER — TELEPHONE (OUTPATIENT)
Dept: SMOKING CESSATION | Facility: CLINIC | Age: 35
End: 2025-03-25
Payer: COMMERCIAL

## 2025-03-25 NOTE — TELEPHONE ENCOUNTER
Called pt regarding follow up appt. Pt answered phone at the same time with her voice mail. Called pt back. No answer. Unable to leave vm. Called [employer]

## 2025-03-29 RX ORDER — SULFAMETHOXAZOLE AND TRIMETHOPRIM 800; 160 MG/1; MG/1
1 TABLET ORAL 2 TIMES DAILY
Qty: 14 TABLET | Refills: 0 | Status: SHIPPED | OUTPATIENT
Start: 2025-03-29 | End: 2025-03-31

## 2025-03-31 ENCOUNTER — OFFICE VISIT (OUTPATIENT)
Dept: SURGERY | Facility: CLINIC | Age: 35
End: 2025-03-31
Attending: SURGERY
Payer: COMMERCIAL

## 2025-03-31 VITALS
SYSTOLIC BLOOD PRESSURE: 114 MMHG | WEIGHT: 293 LBS | DIASTOLIC BLOOD PRESSURE: 84 MMHG | HEART RATE: 91 BPM | BODY MASS INDEX: 39.68 KG/M2 | HEIGHT: 72 IN | OXYGEN SATURATION: 98 % | TEMPERATURE: 99 F | RESPIRATION RATE: 16 BRPM

## 2025-03-31 DIAGNOSIS — N61.1 CHRONIC ABSCESS OF BREAST: ICD-10-CM

## 2025-03-31 DIAGNOSIS — N61.1 ABSCESS OF RIGHT BREAST: Primary | ICD-10-CM

## 2025-03-31 PROCEDURE — 99999 PR PBB SHADOW E&M-EST. PATIENT-LVL IV: CPT | Mod: PBBFAC,,, | Performed by: SURGERY

## 2025-03-31 PROCEDURE — 3008F BODY MASS INDEX DOCD: CPT | Mod: CPTII,S$GLB,, | Performed by: SURGERY

## 2025-03-31 PROCEDURE — 99213 OFFICE O/P EST LOW 20 MIN: CPT | Mod: S$GLB,,, | Performed by: SURGERY

## 2025-03-31 PROCEDURE — 3079F DIAST BP 80-89 MM HG: CPT | Mod: CPTII,S$GLB,, | Performed by: SURGERY

## 2025-03-31 PROCEDURE — 1159F MED LIST DOCD IN RCRD: CPT | Mod: CPTII,S$GLB,, | Performed by: SURGERY

## 2025-03-31 PROCEDURE — 3074F SYST BP LT 130 MM HG: CPT | Mod: CPTII,S$GLB,, | Performed by: SURGERY

## 2025-03-31 RX ORDER — BENZONATATE 200 MG/1
200 CAPSULE ORAL
COMMUNITY
Start: 2025-03-31

## 2025-03-31 RX ORDER — SULFAMETHOXAZOLE AND TRIMETHOPRIM 800; 160 MG/1; MG/1
TABLET ORAL
COMMUNITY

## 2025-03-31 RX ORDER — CIPROFLOXACIN 500 MG/1
500 TABLET ORAL 2 TIMES DAILY
COMMUNITY
Start: 2025-03-17

## 2025-03-31 RX ORDER — OXYCODONE AND ACETAMINOPHEN 5; 325 MG/1; MG/1
1 TABLET ORAL EVERY 8 HOURS PRN
Qty: 20 TABLET | Refills: 0 | Status: SHIPPED | OUTPATIENT
Start: 2025-03-31

## 2025-03-31 NOTE — LETTER
March 31, 2025    Rene Alston  [address]  [place] [address]             [facility] [place] [facility]  Breast Surgery  [address] [address]  [place] [address]  Phone: [phone]  Fax: [phone]   March 31, 2025     Patient: Rene Alston   YOB: 1990   Date of Visit: 3/31/2025       To Whom it May Concern:    Rene Alston was seen in my clinic on 3/31/2025. She may return to work on 03/31/2025 .    Please excuse her from any classes or work missed.    If you have any questions or concerns, please don't hesitate to call.    Sincerely,         Deanna Garduno MA

## 2025-03-31 NOTE — Clinical Note
March 31, 2025      [facility]  [address]  [place] [address]  Phone: [phone]  Fax: [phone]       Patient: Rene Alston   YOB: 1990  Date of Visit: 03/31/2025    To Whom It May Concern:    Deloris Alston  was at [facility] on 03/31/2025. The patient may return to work/school on *** {With/no:39009} restrictions. If you have any questions or concerns, or if I can be of further assistance, please do not hesitate to contact me.    Sincerely,    Deanna Garduno MA

## 2025-04-02 NOTE — PROGRESS NOTES
[facility]  Breast Surgical Oncology  Patient Office Visit       Referring Provider: Dr. Naomi Ray  PCP: Anu, Primary Doctor   Medical Oncologist: No care team member to display   Radiation Oncologist: No care team member to display       Patient Care Team:  Anu, Primary Doctor as PCP - General      Chief Complaint:   Chief Complaint   Patient presents with    Follow-up     Patient c/o that both breast are swollen, excruciating pain that is constant to both breast, patient has a left breast abscess that ruptured x1 day ago with discharge that is greenish, yellow and red discharge with a foul smell , the right breast is constantly throbbing, warm to touch with visible redness x4 days         Subjective:   Treatment History:        Interval History:  3/31/2025 - Rene Alston     HPI:  Rene Alston is a 35 y.o. female who presents on 3/31/2025 for recurrent bilateral breast abscesses. Called the service line overnight and was prescribed bactrim by Dr. Smith.   She is currently in pain and the abscesses are similar to what she has experienced recurrently in the past.     Imaging:   None at this time    Pathology:  None current    Family History:  Family History   Problem Relation Name Age of Onset    Breast cancer Neg Hx      Cervical cancer Neg Hx      Uterine cancer Neg Hx          Past History:  Past Medical History:   Diagnosis Date    ADHD (attention deficit hyperactivity disorder)     Anxiety     Arthritis     Cancer     Depression     Encounter for blood transfusion         Past Surgical History:   Procedure Laterality Date    INCISION AND DRAINAGE OF ABSCESS  10/6/2024    Procedure: INCISION AND DRAINAGE, ABSCESS;  Surgeon: Naomi Ray MD;  Location: [facility];  Service: General;;    MASTOTOMY, WITH ABSCESS DRAINAGE Right 10/6/2024    Procedure: MASTOTOMY,WITH ABSCESS DRAINAGE;  Surgeon: Naomi Ray MD;  Location: [facility];  Service: General;   Laterality: Right;        Social History     Socioeconomic History    Marital status: Single   Tobacco Use    Smoking status: Every Day     Current packs/day: 1.00     Average packs/day: 0.5 packs/day for 13.5 years (6.7 ttl pk-yrs)     Types: Cigarettes     Start date: 2009     Last attempt to quit: 12/2/2024     Passive exposure: Current    Smokeless tobacco: Never    Tobacco comments:     2/13/25-patient is ready to quit smoking and wants to be referred to the smoking cessation program   Substance and Sexual Activity    Drug use: Yes     Types: Marijuana     Comment: Medical Marijuana Use     Social Drivers of Health     Financial Resource Strain: High Risk (10/3/2024)    Overall Financial Resource Strain (CARDIA)     Difficulty of Paying Living Expenses: Hard   Food Insecurity: Food Insecurity Present (10/3/2024)    Hunger Vital Sign     Worried About Running Out of Food in the Last Year: Often true     Ran Out of Food in the Last Year: Often true   Transportation Needs: No Transportation Needs (10/3/2024)    TRANSPORTATION NEEDS     Transportation : No   Physical Activity: Inactive (10/3/2024)    Exercise Vital Sign     Days of Exercise per Week: 0 days     Minutes of Exercise per Session: 0 min   Stress: Stress Concern Present (10/3/2024)    [employer] - Occupational Stress Questionnaire     Feeling of Stress : To some extent   Housing Stability: High Risk (10/3/2024)    Housing Stability Vital Sign     Unable to Pay for Housing in the Last Year: Yes     Homeless in the Last Year: No        Body mass index is 52.68 kg/m².     Allergy/Medications:   Review of patient's allergies indicates:   Allergen Reactions    Iodine Rash and Itching    Nickel           Current Outpatient Medications:     benzonatate (TESSALON) 200 MG capsule, Take 200 mg by mouth., Disp: , Rfl:     busPIRone (BUSPAR) 10 MG tablet, Take 10 mg by mouth., Disp: , Rfl:     ciprofloxacin HCl (CIPRO) 500 MG tablet,  Take 500 mg by mouth 2 (two) times daily., Disp: , Rfl:     fluconazole (DIFLUCAN) 150 MG Tab, Take by mouth., Disp: , Rfl:     nicotine (NICODERM CQ) 21 mg/24 hr, Place 1 patch onto the skin once daily., Disp: 28 patch, Rfl: 0    nicotine polacrilex 2 MG Lozg, Take 1 lozenge (2 mg total) by mouth as needed (Do not exceed 10 pieces per day.)., Disp: 144 lozenge, Rfl: 0    nystatin (MYCOSTATIN) cream, Apply topically 3 (three) times daily., Disp: , Rfl:     nystatin (MYCOSTATIN) powder, SMARTSI Topical Twice Daily, Disp: , Rfl:     oxyCODONE-acetaminophen (PERCOCET) 5-325 mg per tablet, Take 1 tablet by mouth every 8 (eight) hours as needed for Pain., Disp: 20 tablet, Rfl: 0    sulfamethoxazole-trimethoprim 800-160mg (BACTRIM DS) 800-160 mg Tab, 1 tablet Orally Twice a day, Disp: , Rfl:        Review of Systems:  Review of Systems   Constitutional:  Negative for chills, fever, malaise/fatigue and weight loss.   Respiratory:  Negative for cough and hemoptysis.    Cardiovascular:  Negative for chest pain.   Skin:  Negative for itching and rash.           Objective:     Vitals:  Blood pressure 114/84, pulse 91, temperature 99.1 °F (37.3 °C), temperature source Oral, resp. rate 16, height 6' (1.829 m), weight (!) 176.2 kg (388 lb 6.4 oz), SpO2 98%.      Physical Exam:  General: The patient is awake, alert and oriented times three. The patient is well nourished and in no acute distress.  Neck: There is no evidence of palpable cervical, supraclavicular or axillary adenopathy. The neck is supple. The thyroid is not enlarged.  Musculoskeletal: The patient has a normal range of motion of her bilateral upper extremities.  Chest: Examination of the chest wall fails to reveal any obvious abnormalities. Nonlabored breathing, symmetric expansion.  Breast:  Right: area of cellulitis with underlying phlegmon to 3 oclock position, small area that does feel fluctuant  Left : 9 oclock respective to the areola area of open abscess,  small amount of purulence expressed  Abdomen: The abdomen is soft, flat, nontender and nondistended.  Integumentary: no rashes or skin lesions present  Neurologic: cranial nerves intact, no signs of peripheral neurological deficit, motor/sensory function intact          Assessment and Plan:   Ms. 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 Bilateral breasts with recurrent infection, active.  We again discussed she consider central duct excision as this disease process is frequently recurrent. I will see her back in 2 weeks, advised her to present to ED should systemic symptoms occur, such as fever, chills, etc, or if the pain and infection worsens.     Recurrent bilateral subareolar breast abscesses.       She is attempting to quit smoking, reports successfully cutting back substantially. We discussed the impact that this will have on wound healing, encouraged her to continue to work toward cessation.        Plan:     Bilateral breast abscesses recurrent, left breast spontaneously drained, right breast with active, nondrained abscess.     Pt in smoking cessation program, reiterated that this will decrease breast abscess occurrence.     Recommended incision and drainage in office of right breast abscess, patient refused, prefers to continue warm compresses and pain control.     Recommended taking Bactrim as prescribed by Dr. Smith.   Rtc in 2 weeks,   Limited supply of percocet 5/325mg tabs prescribed, discussed risks for addiction and need to limit use.     All of questions were answered    Naomi Ray MD  Breast Surgery    OFFICE VISIT CODING:    Non-face-to-face time included:  Yes Preparing to see the patient such as reviewing the patient record  no Obtaining and reviewing separately obtained history  no Independently interpreting results  Yes Documenting clinical information in electronic health record  Yes Ordering appropriate  medications  no Ordering appropriate tests  Yes Ordering appropriate procedures (including follow-up)  Yes Referring and communicating with other health care professionals (not separately reported)  No Care Coordination (not separately reported)    Face-to-face time included:  Yes Performing a medically necessary appropriate history, examination, and/or evaluation  Yes Communicating results to the patient/family/caregiver  Yes Counseling and educating the patient/family/caregiver  Yes Answering patient/family/caregiver questions    Total Time: 25 minutes    Total time includes both face-to-face and non-face-to-face time personally spent by myself on the day of the visit.

## 2025-04-07 ENCOUNTER — OFFICE VISIT (OUTPATIENT)
Dept: SURGERY | Facility: CLINIC | Age: 35
End: 2025-04-07
Payer: COMMERCIAL

## 2025-04-07 VITALS
WEIGHT: 293 LBS | BODY MASS INDEX: 39.68 KG/M2 | RESPIRATION RATE: 18 BRPM | OXYGEN SATURATION: 98 % | DIASTOLIC BLOOD PRESSURE: 78 MMHG | HEART RATE: 81 BPM | HEIGHT: 72 IN | TEMPERATURE: 99 F | SYSTOLIC BLOOD PRESSURE: 123 MMHG

## 2025-04-07 DIAGNOSIS — N61.1 CHRONIC ABSCESS OF BREAST: Primary | ICD-10-CM

## 2025-04-07 PROCEDURE — 99999 PR PBB SHADOW E&M-EST. PATIENT-LVL IV: CPT | Mod: PBBFAC,,, | Performed by: SURGERY

## 2025-04-07 RX ORDER — SULFAMETHOXAZOLE AND TRIMETHOPRIM 800; 160 MG/1; MG/1
1 TABLET ORAL 2 TIMES DAILY
Qty: 14 TABLET | Refills: 0 | Status: SHIPPED | OUTPATIENT
Start: 2025-04-07

## 2025-04-07 RX ORDER — HYDROCODONE BITARTRATE AND ACETAMINOPHEN 7.5; 325 MG/1; MG/1
1 TABLET ORAL 4 TIMES DAILY PRN
COMMUNITY
Start: 2025-03-17

## 2025-04-07 NOTE — PROGRESS NOTES
[facility] - [facility]  Breast Surgical Oncology  Patient Office Visit       Referring Provider: No ref. provider found  PCP: No, Primary Doctor   Medical Oncologist: No care team member to display   Radiation Oncologist: No care team member to display       Patient Care Team:  Anu, Primary Doctor as PCP - General      Chief Complaint:   Chief Complaint   Patient presents with    Follow-up     Patient denies any new breast concerns abscess has resolved itself per patient         Subjective:   Treatment History:        Interval History:  4/7/2025 - Rene Alston     HPI:  Rene Alston is a 35 y.o. female who presents on 4/7/2025 for recurrent bilateral breast abscesses.   Since last visit both breasts have ruptured abscess cavities that are now in state of healing. Pain resolved, patient complains of pruritis.   Is attempting to quit smoking, struggling with life stressors.   Imaging:   None at this time    Pathology:  None current    Family History:  Family History   Problem Relation Name Age of Onset    Breast cancer Neg Hx      Cervical cancer Neg Hx      Uterine cancer Neg Hx          Past History:  Past Medical History:   Diagnosis Date    ADHD (attention deficit hyperactivity disorder)     Anxiety     Arthritis     Cancer     Depression     Encounter for blood transfusion         Past Surgical History:   Procedure Laterality Date    INCISION AND DRAINAGE OF ABSCESS  10/6/2024    Procedure: INCISION AND DRAINAGE, ABSCESS;  Surgeon: Naomi Ray MD;  Location: [facility];  Service: General;;    MASTOTOMY, WITH ABSCESS DRAINAGE Right 10/6/2024    Procedure: MASTOTOMY,WITH ABSCESS DRAINAGE;  Surgeon: Naomi Ray MD;  Location: [facility];  Service: General;  Laterality: Right;        Social History     Socioeconomic History    Marital status: Single   Tobacco Use    Smoking status: Every Day     Current packs/day: 1.00     Average packs/day: 0.5 packs/day for 13.5 years  (6.7 ttl pk-yrs)     Types: Cigarettes     Start date: 2009     Last attempt to quit: 12/2/2024     Passive exposure: Current    Smokeless tobacco: Never    Tobacco comments:     2/13/25-patient is ready to quit smoking and wants to be referred to the smoking cessation program   Substance and Sexual Activity    Drug use: Yes     Types: Marijuana     Comment: Medical Marijuana Use     Social Drivers of Health     Financial Resource Strain: High Risk (10/3/2024)    Overall Financial Resource Strain (CARDIA)     Difficulty of Paying Living Expenses: Hard   Food Insecurity: Food Insecurity Present (10/3/2024)    Hunger Vital Sign     Worried About Running Out of Food in the Last Year: Often true     Ran Out of Food in the Last Year: Often true   Transportation Needs: No Transportation Needs (10/3/2024)    TRANSPORTATION NEEDS     Transportation : No   Physical Activity: Inactive (10/3/2024)    Exercise Vital Sign     Days of Exercise per Week: 0 days     Minutes of Exercise per Session: 0 min   Stress: Stress Concern Present (10/3/2024)    [employer] [place] [employer] - Occupational Stress Questionnaire     Feeling of Stress : To some extent   Housing Stability: High Risk (10/3/2024)    Housing Stability Vital Sign     Unable to Pay for Housing in the Last Year: Yes     Homeless in the Last Year: No        Body mass index is 53 kg/m².     Allergy/Medications:   Review of patient's allergies indicates:   Allergen Reactions    Iodine Rash and Itching    Nickel           Current Outpatient Medications:     HYDROcodone-acetaminophen (NORCO) 7.5-325 mg per tablet, Take 1 tablet by mouth 4 (four) times daily as needed., Disp: , Rfl:     benzonatate (TESSALON) 200 MG capsule, Take 200 mg by mouth., Disp: , Rfl:     busPIRone (BUSPAR) 10 MG tablet, Take 10 mg by mouth., Disp: , Rfl:     ciprofloxacin HCl (CIPRO) 500 MG tablet, Take 500 mg by mouth 2 (two) times daily., Disp: , Rfl:     fluconazole (DIFLUCAN) 150 MG  Tab, Take by mouth., Disp: , Rfl:     nicotine (NICODERM CQ) 21 mg/24 hr, Place 1 patch onto the skin once daily., Disp: 28 patch, Rfl: 0    nicotine polacrilex 2 MG Lozg, Take 1 lozenge (2 mg total) by mouth as needed (Do not exceed 10 pieces per day.)., Disp: 144 lozenge, Rfl: 0    nystatin (MYCOSTATIN) cream, Apply topically 3 (three) times daily., Disp: , Rfl:     nystatin (MYCOSTATIN) powder, SMARTSI Topical Twice Daily, Disp: , Rfl:     oxyCODONE-acetaminophen (PERCOCET) 5-325 mg per tablet, Take 1 tablet by mouth every 8 (eight) hours as needed for Pain., Disp: 20 tablet, Rfl: 0    sulfamethoxazole-trimethoprim 800-160mg (BACTRIM DS) 800-160 mg Tab, 1 tablet Orally Twice a day, Disp: , Rfl:        Review of Systems:  Review of Systems   Constitutional:  Negative for chills, fever, malaise/fatigue and weight loss.   Respiratory:  Positive for cough. Negative for hemoptysis.    Cardiovascular:  Negative for chest pain.   Skin:  Positive for itching. Negative for rash.           Objective:     Vitals:  Blood pressure 123/78, pulse 81, temperature 99 °F (37.2 °C), temperature source Oral, resp. rate 18, height 6' (1.829 m), weight (!) 177.3 kg (390 lb 12.8 oz), SpO2 98%.      Physical Exam:  General: The patient is awake, alert and oriented times three. The patient is well nourished and in no acute distress.  Neck: There is no evidence of palpable cervical, supraclavicular or axillary adenopathy. The neck is supple. The thyroid is not enlarged.  Musculoskeletal: The patient has a normal range of motion of her bilateral upper extremities.  Chest: Examination of the chest wall fails to reveal any obvious abnormalities. Nonlabored breathing, symmetric expansion.  Breast:  Right: 3 oclock area with evidence of ruptured abscess, healing with no surrounding erythema    Left : 9 oclock respective to the areola area resolving abscess, in state of healing   Abdomen: The abdomen is soft, flat, nontender and  nondistended.  Integumentary: no rashes or skin lesions present  Neurologic: cranial nerves intact, no signs of peripheral neurological deficit, motor/sensory function intact          Assessment and Plan:   Ms. 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 Bilateral breasts with recurrent infection, resolving.  We again discussed she quit or significantly cut back on smoking cigarettes- she is seeing smoking cessation services; consider central duct excision as this disease process is frequently recurrent. I will see her back in 2 months, advised her to present to ED should systemic symptoms occur, such as fever, chills, etc, or if the pain and infection worsens.     Recurrent bilateral subareolar breast abscesses.       She is attempting to quit smoking, reports successfully cutting back substantially. We discussed the impact that this will have on wound healing, encouraged her to continue to work toward cessation.        Plan:     Bilateral breast abscesses recurrent, left breast spontaneously drained, right breast also drained abscess.     Pt in smoking cessation program, reiterated that this will decrease breast abscess occurrence.      Recommended taking Bactrim, provided 1 week refill.   Rtc in 2 months to discuss smoking cessation journey and possible central duct excision,       All of questions were answered    Naomi Ray MD  Breast Surgery    OFFICE VISIT CODING:    Non-face-to-face time included:  Yes Preparing to see the patient such as reviewing the patient record  no Obtaining and reviewing separately obtained history  no Independently interpreting results  Yes Documenting clinical information in electronic health record  Yes Ordering appropriate medications  no Ordering appropriate tests  Yes Ordering appropriate procedures (including follow-up)  Yes Referring and communicating with other health care professionals (not  separately reported)  No Care Coordination (not separately reported)    Face-to-face time included:  Yes Performing a medically necessary appropriate history, examination, and/or evaluation  Yes Communicating results to the patient/family/caregiver  Yes Counseling and educating the patient/family/caregiver  Yes Answering patient/family/caregiver questions    Total Time: 25 minutes    Total time includes both face-to-face and non-face-to-face time personally spent by myself on the day of the visit.

## 2025-04-26 PROBLEM — Z76.89 ENCOUNTER TO ESTABLISH CARE WITH NEW DOCTOR: Status: ACTIVE | Noted: 2025-04-26

## 2025-06-23 ENCOUNTER — TELEPHONE (OUTPATIENT)
Dept: SMOKING CESSATION | Facility: CLINIC | Age: 35
End: 2025-06-23
Payer: COMMERCIAL

## 2025-07-15 ENCOUNTER — TELEPHONE (OUTPATIENT)
Dept: INTERNAL MEDICINE | Facility: CLINIC | Age: 35
End: 2025-07-15
Payer: COMMERCIAL

## 2025-07-19 ENCOUNTER — TELEPHONE (OUTPATIENT)
Dept: SURGERY | Facility: CLINIC | Age: 35
End: 2025-07-19
Payer: COMMERCIAL

## 2025-07-19 DIAGNOSIS — N61.1 ABSCESS OF RIGHT BREAST: ICD-10-CM

## 2025-07-19 DIAGNOSIS — N64.4 BREAST PAIN: Primary | ICD-10-CM

## 2025-07-19 RX ORDER — SULFAMETHOXAZOLE AND TRIMETHOPRIM 800; 160 MG/1; MG/1
1 TABLET ORAL 2 TIMES DAILY
Qty: 28 TABLET | Refills: 0 | Status: SHIPPED | OUTPATIENT
Start: 2025-07-19 | End: 2025-08-02

## 2025-07-19 RX ORDER — OXYCODONE AND ACETAMINOPHEN 5; 325 MG/1; MG/1
1 TABLET ORAL EVERY 8 HOURS PRN
Qty: 9 TABLET | Refills: 0 | Status: SHIPPED | OUTPATIENT
Start: 2025-07-19 | End: 2025-07-22

## 2025-07-19 NOTE — ASSESSMENT & PLAN NOTE
Recommendations:  Warm soaks daily  Bactrim prescribed and sent to pharmacy on file  3 days of Percocet were prescribed as well.  Recommend she continue to alternate 800 mg Ibuprofen with Percocet.  Will have office call Monday to follow-up.

## 2025-07-19 NOTE — TELEPHONE ENCOUNTER
Patient called with concerns for pain in her breast. She is known to have recurrent abscess and infections. She tried to do a telemedicine visit and was only prescribed 800 mg Ibuprofen and no antibiotics per her report.     She does not have fever or chills.     1. Breast pain    2. Abscess of right breast  Assessment & Plan:  Recommendations:  Warm soaks daily  Bactrim prescribed and sent to pharmacy on file  3 days of Percocet were prescribed as well.  Recommend she continue to alternate 800 mg Ibuprofen with Percocet.  Will have office call Monday to follow-up.    Orders:  -     oxyCODONE-acetaminophen (PERCOCET) 5-325 mg per tablet; Take 1 tablet by mouth every 8 (eight) hours as needed for Pain.  Dispense: 9 tablet; Refill: 0    Other orders  -     sulfamethoxazole-trimethoprim 800-160mg (BACTRIM DS) 800-160 mg Tab; Take 1 tablet by mouth 2 (two) times daily. for 14 days  Dispense: 28 tablet; Refill: 0        All of questions were answered    Manda Roger MD  Breast Surgical Oncology

## 2025-07-21 ENCOUNTER — DOCUMENTATION ONLY (OUTPATIENT)
Dept: SURGERY | Facility: CLINIC | Age: 35
End: 2025-07-21
Payer: COMMERCIAL

## 2025-07-21 NOTE — PROGRESS NOTES
Patient called the service over the weekend and Dr. Roger recommended patient to follow up Monday 7/21/25. Tried calling patient to schedule but had to LM. s

## 2025-08-13 ENCOUNTER — TELEPHONE (OUTPATIENT)
Dept: SMOKING CESSATION | Facility: CLINIC | Age: 35
End: 2025-08-13
Payer: COMMERCIAL

## 2025-08-14 ENCOUNTER — TELEPHONE (OUTPATIENT)
Dept: SMOKING CESSATION | Facility: CLINIC | Age: 35
End: 2025-08-14
Payer: COMMERCIAL

## 2025-08-27 ENCOUNTER — TELEPHONE (OUTPATIENT)
Dept: SMOKING CESSATION | Facility: CLINIC | Age: 35
End: 2025-08-27
Payer: COMMERCIAL

## 2025-08-27 ENCOUNTER — CLINICAL SUPPORT (OUTPATIENT)
Dept: SMOKING CESSATION | Facility: CLINIC | Age: 35
End: 2025-08-27
Payer: COMMERCIAL

## 2025-08-27 DIAGNOSIS — F17.200 NICOTINE DEPENDENCE: Primary | ICD-10-CM

## 2025-08-27 PROCEDURE — 99406 BEHAV CHNG SMOKING 3-10 MIN: CPT | Mod: S$GLB,,, | Performed by: STUDENT IN AN ORGANIZED HEALTH CARE EDUCATION/TRAINING PROGRAM

## 2025-08-27 PROCEDURE — 99999 PR PBB SHADOW E&M-EST. PATIENT-LVL I: CPT | Mod: PBBFAC,,, | Performed by: GENERAL PRACTICE

## (undated) DEVICE — SEE L#159833

## (undated) DEVICE — GLOVE PROTEXIS LTX MICRO  7.5

## (undated) DEVICE — SOL IRRI STRL WATER 1000ML

## (undated) DEVICE — SWAB CULTURETTE SINGLE

## (undated) DEVICE — CULTSWAB+ AMIES W/O CHARC SNG

## (undated) DEVICE — BLADE SURG STAINLESS STEEL #15

## (undated) DEVICE — NDL HYPO REG 25G X 1 1/2

## (undated) DEVICE — ELECTRODE PATIENT RETURN DISP

## (undated) DEVICE — KIT SURGICAL TURNOVER

## (undated) DEVICE — Device

## (undated) DEVICE — SYR 10CC LUER LOCK

## (undated) DEVICE — BOWL STERILE LARGE 32OZ